# Patient Record
Sex: FEMALE | Race: WHITE | Employment: PART TIME | RURAL
[De-identification: names, ages, dates, MRNs, and addresses within clinical notes are randomized per-mention and may not be internally consistent; named-entity substitution may affect disease eponyms.]

---

## 2017-01-05 ENCOUNTER — TELEPHONE (OUTPATIENT)
Dept: PRIMARY CARE CLINIC | Age: 47
End: 2017-01-05

## 2017-01-07 ENCOUNTER — HOSPITAL ENCOUNTER (OUTPATIENT)
Facility: HOSPITAL | Age: 47
Setting detail: HOSPITAL OUTPATIENT SURGERY
End: 2017-01-07
Attending: OBSTETRICS & GYNECOLOGY | Admitting: OBSTETRICS & GYNECOLOGY

## 2017-01-12 PROBLEM — M48.061 LUMBAR STENOSIS: Status: ACTIVE | Noted: 2017-01-12

## 2017-01-17 ENCOUNTER — OFFICE VISIT (OUTPATIENT)
Dept: NEUROSURGERY | Facility: CLINIC | Age: 47
End: 2017-01-17

## 2017-01-17 VITALS — WEIGHT: 164 LBS | HEIGHT: 59 IN | BODY MASS INDEX: 33.06 KG/M2

## 2017-01-17 DIAGNOSIS — M43.16 SPONDYLOLISTHESIS OF LUMBAR REGION: Primary | ICD-10-CM

## 2017-01-17 PROCEDURE — 99243 OFF/OP CNSLTJ NEW/EST LOW 30: CPT | Performed by: NEUROLOGICAL SURGERY

## 2017-01-17 RX ORDER — HYDROCODONE BITARTRATE AND ACETAMINOPHEN 5; 325 MG/1; MG/1
TABLET ORAL
Refills: 0 | COMMUNITY
Start: 2016-12-22 | End: 2017-01-31

## 2017-01-17 RX ORDER — BACLOFEN 20 MG/1
20 TABLET ORAL 3 TIMES DAILY
Refills: 0 | COMMUNITY
Start: 2016-12-28

## 2017-01-17 NOTE — LETTER
January 17, 2017     KB Landa  [address] [facility] [place] [address]    Patient: Addie Camarillo   YOB: 1970   Date of Visit: 1/17/2017       Dear KB Puente:    Thank you for referring Addie Camarillo to me for evaluation. Below is a copy of my consult note.    If you have questions, please do not hesitate to call me. I look forward to following Addie along with you.         Sincerely,        Jurgen Umana MD        CC: No Recipients    Subjective   Patient ID: Addie Camarillo is a 46 y.o. female is being seen for consultation today at the request of KB Landa  Chief Complaint: Back and leg pain    History of Present Illness: Patient is 46-year-old woman with complaint of back pain in the lumbar spine that radiates to her hips and legs bilaterally currently more on the right than on the left but in the past it has been more on the left.  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  She used to work 70 hours, but a couple of years ago had to cut it back to part-time at 10 hours.  She is tried physical therapy but after only a few treatments found she was worse with the treatment plan without and had had to be stopped.  She is applying for disability at this point.  She is due to have a hysterectomy next month.  She is allergic to Lortab.  She uses ibuprofen and diclofenac for pain.    Review of Radiographic Studies:  Lumbar MRI scan on 12/20/16 shows a 25% spondylolisthesis at L5-S1 with bilateral foraminal stenosis more marked on the left than the right.  The remaining disc space levels are normal.    The following portions of the patient's history were reviewed, updated as appropriate and approved: allergies, current medications, past family history, past medical history, past social history, past surgical history, review of systems and problem list.  Review of Systems   Constitutional: Positive for chills and fatigue. Negative for  activity change, appetite change, diaphoresis, fever and unexpected weight change.   HENT: Negative for congestion, dental problem, drooling, ear discharge, ear pain, facial swelling, hearing loss, mouth sores, nosebleeds, postnasal drip, rhinorrhea, sinus pressure, sneezing, sore throat, tinnitus, trouble swallowing and voice change.    Eyes: Positive for photophobia. Negative for pain, discharge, redness, itching and visual disturbance.   Respiratory: Positive for cough and wheezing. Negative for apnea, choking, chest tightness, shortness of breath and stridor.    Cardiovascular: Negative for chest pain, palpitations and leg swelling.   Gastrointestinal: Negative for abdominal distention, abdominal pain, anal bleeding, blood in stool, constipation, diarrhea, nausea, rectal pain and vomiting.   Endocrine: Positive for cold intolerance and heat intolerance. Negative for polydipsia, polyphagia and polyuria.   Genitourinary: Positive for menstrual problem. Negative for decreased urine volume, difficulty urinating, dysuria, enuresis, flank pain, frequency, genital sores, hematuria and urgency.   Musculoskeletal: Positive for arthralgias, back pain, joint swelling, neck pain and neck stiffness. Negative for gait problem and myalgias.   Skin: Negative for color change, pallor, rash and wound.   Allergic/Immunologic: Negative for environmental allergies, food allergies and immunocompromised state.   Neurological: Positive for light-headedness, numbness and headaches. Negative for dizziness, tremors, seizures, syncope, facial asymmetry, speech difficulty and weakness.   Hematological: Negative for adenopathy. Bruises/bleeds easily.   Psychiatric/Behavioral: Positive for agitation, decreased concentration, dysphoric mood and sleep disturbance. Negative for behavioral problems, hallucinations, self-injury and suicidal ideas. The patient is nervous/anxious. The patient is not hyperactive.    All other systems reviewed and are  negative.      Objective     NEUROLOGICAL EXAMINATION:      MENTAL STATUS:  Alert and oriented.  Speech intact.  Recent and remote memory intact.      CRANIAL NERVES:  Cranial nerve II:  Visual fields are full to confrontation.  Cranial nerves III, IV and VI:  PERRLADC.  Extraocular movements are intact.  Nystagmus is not present.  Cranial nerve V:  Facial sensation is intact to light touch.  Cranial nerve VII:  Muscles of facial expression reveal no asymmetry.  Cranial nerve VIII:  Hearing is intact to finger rub bilaterally.  Cranial nerves IX and X:  Palate elevates symmetrically.  Cranial nerve XI:  Shoulder shrug is intact.  Cranial nerve XII:  Tongue is midline without evidence of atrophy or fasciculation.    MUSCULOSKELETAL:  SLR positive on the right at 40°, negative on the left.    MOTOR:  Knee extension ankle dorsiflexion and plantar flexion intact bilaterally.    SENSATION: No focal sensory loss in the legs or feet.    REFLEXES:  DTR 1+ in both knees and both ankles.    Assessment   L5-S1 spondylolisthesis with bilateral pars defect L5 and bilateral foraminal stenosis L5-S1.       Plan   See me in April again after she has recovered from her hysterectomy, and we will discuss the surgery necessary to treat this which would be a PLIF at L5-S1 with pedicle screws.  I have shown her images of the surgery and given her a brief description of it today.        Jurgen Umana MD

## 2017-01-17 NOTE — PATIENT INSTRUCTIONS
Spondylolisthesis With Rehab  The slipping of one or multiple vertebrae out of the correct anatomical position is a condition known as spondylolisthesis. Spondylolisthesis is most common in adolescents and is caused by a number of different reasons, such as vertebral fracture or something you are born with (congenital). Spondylolisthesis is diagnosed with the use of X-rays.  SYMPTOMS   · Dull, achy pain in the lower back.  · Pain that worsens with extension of the spine.  · Tightness of the muscles on the back of the thigh.  · Lower back stiffness.  · Signs of nerve damage: pain, numbness, or weakness affecting one or both lower extremities.  · Muscle wasting (atrophy), uncommon.  · Loss of stool (bowel) or urine (bladder) function.  CAUSES   The symptoms of spondylolisthesis are caused by one or more vertebrae that are out of alignment, placing pressure on the spinal cord. Common mechanisms of injury include:  · Congenital defect of the spine.  · Degenerative process.  · Stress fracture of the spine.  · Fracture due to trauma to the spine.  RISK INCREASES WITH:  · Activities that have a risk of hyperextending the back.  · Activities that have a risk of excessively rotating the spine.  · Poor strength and flexibility.  · Failure to warm up properly before activity.  · Family history of spondylolysis or spondylolisthesis.  · Improper sports technique.  PREVENTION  · Warm up and stretch properly before activity.  · Allow for adequate recovery between workouts.  · Maintain physical fitness:    Strength, flexibility, and endurance.    Cardiovascular fitness.  · Learn and use proper technique. When possible, have a  correct improper technique.  PROGNOSIS   If treated properly, the spondylolisthesis usually resolves.  RELATED COMPLICATIONS   · Recurrent symptoms that result in a chronic problem.  · Inability to compete in athletics.  · Prolonged healing time, if improperly treated or reinjured.  · Failure of the  fracture to heal (nonunion).  · Healing of the fracture in a poor position (malunion).  TREATMENT  Treatment initially involves resting from any activities that aggravate the symptoms and the use of ice and medications to help reduce pain and inflammation. The use of strengthening and stretching exercises may help reduce pain with activity. These exercises may be performed at home or with referral to a therapist. It is important to learn how to use proper body mechanics as to not place undue stress on your spine. If the injury is severe, then your caregiver may recommend a back brace to allow for healing, or even surgery. Surgery often involves fusing two adjacent vertebrae so no movement is allowed between them.   MEDICATION   · If pain medication is necessary, then nonsteroidal anti-inflammatory medications, such as aspirin and ibuprofen, or other minor pain relievers, such as acetaminophen, are often recommended.  · Do not take pain medication for 7 days before surgery.  · Prescription pain relievers may be given if deemed necessary by your caregiver. Use only as directed and only as much as you need.  HEAT AND COLD  · Cold treatment (icing) relieves pain and reduces inflammation. Cold treatment should be applied for 10 to 15 minutes every 2 to 3 hours for inflammation and pain and immediately after any activity that aggravates your symptoms. Use ice packs or massage the area with a piece of ice (ice massage).  · Heat treatment may be used prior to performing the stretching and strengthening activities prescribed by your caregiver, physical therapist, or . Use a heat pack or soak the injury in warm water.  SEEK MEDICAL CARE IF:  · Treatment seems to offer no benefit, or the condition worsens.  · Any medications produce adverse side effects.  · Any complications from surgery occur:    Pain, numbness, or coldness in the extremity operated upon.    Discoloration of the nail beds (they become blue or  gray) of the extremity operated upon.    Signs of infections (fever, pain, inflammation, redness, or persistent bleeding).  EXERCISES  RANGE OF MOTION (ROM) AND STRETCHING EXERCISES - Spondylolisthesis  Most people with low back pain will find that their symptoms worsen with either excessive bending forward (flexion) or arching at the low back (extension). The exercises which will help resolve your symptoms will focus on the opposite motion. Your physician, physical therapist or  will help you determine which exercises will be most helpful to resolve your low back pain. Do not complete any exercises without first consulting with your clinician. Discontinue any exercises which worsen your symptoms until you speak to your clinician. If you have pain, numbness or tingling which travels down into your buttocks, leg, or foot, the goal of the therapy is for these symptoms to move closer to your back and eventually resolve. Occasionally, these leg symptoms will get better, but your low back pain may worsen; this is typically an indication of progress in your rehabilitation. Be certain to be very alert to any changes in your symptoms and the activities in which you participated in the 24 hours prior to the change. Sharing this information with your clinician will allow him/her to most efficiently treat your condition.  These exercises may help you when beginning to rehabilitate your injury. Your symptoms may resolve with or without further involvement from your physician, physical therapist or . While completing these exercises, remember:   · Restoring tissue flexibility helps normal motion to return to the joints. This allows healthier, less painful movement and activity.  · An effective stretch should be held for at least 30 seconds.  · A stretch should never be painful. You should only feel a gentle lengthening or release in the stretched tissue.  FLEXION RANGE OF MOTION AND STRETCHING  "EXERCISES:  STRETCH - Flexion, Single Knee to Chest  · Lie on a firm bed or floor with both legs extended in front of you.  · Keeping one leg in contact with the floor, bring your opposite knee to your chest. Hold your leg in place by either grabbing behind your thigh or at your knee.  · Pull until you feel a gentle stretch in your low back. Hold __________ seconds.  Slowly release your grasp and repeat the exercise with the opposite side.  Repeat __________ times. Complete this exercise __________ times per day.   STRETCH - Flexion, Double Knee to Chest  · Lie on a firm bed or floor with both legs extended in front of you.  · Keeping one leg in contact with the floor, bring your opposite knee to your chest.  · Tense your stomach muscles to support your back and then lift your other knee to your chest. Hold your legs in place by either grabbing behind your thighs or at your knees.  · Pull both knees toward your chest until you feel a gentle stretch in your low back. Hold __________ seconds.  · Tense your stomach muscles and slowly return one leg at a time to the floor.  Repeat __________ times. Complete this exercise __________ times per day.   STRENGTHENING EXERCISES - Spondylolisthesis  These exercises may help you when beginning to rehabilitate your injury. These exercises should be done near your \"sweet spot.\" This is the neutral, low-back arch, somewhere between fully rounded and fully arched, that is your least painful position. When performed in this safe range of motion, these exercises can be used for people who have either a flexion or extension based injury. These exercises may resolve your symptoms with or without further involvement from your physician, physical therapist or . While completing these exercises, remember:   · Muscles can gain both the endurance and the strength needed for everyday activities through controlled exercises.  · Complete these exercises as instructed by your " physician, physical therapist or . Progress the resistance and repetitions only as guided.  · You may experience muscle soreness or fatigue, but the pain or discomfort you are trying to eliminate should never worsen during these exercises. If this pain does worsen, stop and make certain you are following the directions exactly. If the pain is still present after adjustments, discontinue the exercise until you can discuss the trouble with your clinician.  STRENGTHENING - Deep Abdominals, Pelvic Tilt   · Lie on a firm bed or floor. Keeping your legs in front of you, bend your knees so they are both pointed toward the ceiling and your feet are flat on the floor.  · Tense your lower abdominal muscles to press your low back into the floor. This motion will rotate your pelvis so that your tail bone is scooping upwards rather than pointing at your feet or into the floor.  · With a gentle tension and even breathing, hold this position for __________ seconds.  Repeat __________ times. Complete this exercise __________ times per day.   STRENGTHENING - Abdominals, Crunches   · Lie on a firm bed or floor. Keeping your legs in front of you, bend your knees so they are both pointed toward the ceiling and your feet are flat on the floor. Cross your arms over your chest.  · Slightly tip your chin down without bending your neck.  · Tense your abdominals and slowly lift your trunk high enough to just clear your shoulder blades. Lifting higher can put excessive stress on the low back and does not further strengthen your abdominal muscles.  · Control your return to the starting position.  Repeat __________ times. Complete this exercise __________ times per day.   STRENGTHENING - Quadruped, Opposite UE/LE Lift   · Assume a hands and knees position on a firm surface. Keep your hands under your shoulders and your knees under your hips. You may place padding under your knees for comfort.  · Find your neutral spine and  gently tense your abdominal muscles so that you can maintain this position. Your shoulders and hips should form a rectangle that is parallel with the floor and is not twisted.  · Keeping your trunk steady, lift your right hand no higher than your shoulder and then your left leg no higher than your hip. Make sure you are not holding your breath. Hold this position __________ seconds.  · Continuing to keep your abdominal muscles tense and your back steady, slowly return to your starting position. Repeat with the opposite arm and leg.  Repeat __________ times. Complete this exercise __________ times per day.   STRENGTHENING - Lower Abdominals, Double Knee Lift  · Lie on a firm bed or floor. Keeping your legs in front of you, bend your knees so they are both pointed toward the ceiling and your feet are flat on the floor.  · Tense your abdominal muscles to brace your low back and slowly lift both of your knees until they come over your hips. Be certain not to hold your breath.  · Hold __________ seconds. Using your abdominal muscles, return to the starting position in a slow and controlled manner.  Repeat __________ times. Complete this exercise __________ times per day.   POSTURE AND BODY MECHANICS CONSIDERATIONS - Spondylolisthesis  Keeping correct posture when sitting, standing or completing your activities will reduce the stress put on different body tissues, allowing injured tissues a chance to heal and limiting painful experiences. The following are general guidelines for improved posture. Your physician or physical therapist will provide you with any instructions specific to your needs. While reading these guidelines, remember:  · The exercises prescribed by your provider will help you have the flexibility and strength to maintain correct postures.  · The correct posture provides the optimal environment for your joints to work. All of your joints have less wear and tear when properly supported by a spine with good  posture. This means you will experience a healthier, less painful body.  · Correct posture must be practiced with all of your activities, especially prolonged sitting and standing. Correct posture is as important when doing repetitive low-stress activities (typing) as it is when doing a single heavy-load activity (lifting).  PROPER SITTING POSTURE  In order to minimize stress and discomfort on your spine, you must sit with correct posture. Sitting with good posture should be effortless for a healthy body. Returning to good posture is a gradual process. Many people can work toward this most comfortably by using various supports until they have the flexibility and strength to maintain this posture on their own.  When sitting with proper posture, your ears will fall over your shoulders and your shoulders will fall over your hips. You should use the back of the chair to support your upper back. Your low back will be in a neutral position, just slightly arched. You may place a small pillow or folded towel at the base of your low back for   support.   When working at a desk, create an environment that supports good, upright posture. Without extra support, muscles fatigue and lead to excessive strain on joints and other tissues. Keep these recommendations in mind:  CHAIR:  · A chair should be able to slide under your desk when your back makes contact with the back of the chair. This allows you to work closely.  · The chair's height should allow your eyes to be level with the upper part of your monitor and your hands to be slightly lower than your elbows.  BODY POSITION  · Your feet should make contact with the floor. If this is not possible, use a foot rest.  · Keep your ears over your shoulders. This will reduce stress on your neck and low back.  INCORRECT SITTING POSTURES  If you are feeling tired and unable to assume a healthy sitting posture, do not slouch or slump. This puts excessive strain on your back tissues,  causing more damage and pain. Healthier options include:  · Using more support, like a lumbar pillow.  · Switching tasks to something that requires you to be upright or walking.  · Taking a brief walk.  · Lying down to rest in a neutral-spine position.   CORRECT LIFTING TECHNIQUES  DO:   · Assume a wide stance. This will provide you more stability and the opportunity to get as close as possible to the object which you are lifting.  · Tense your abdominals to brace your spine; then bend at the knees and hips. Keeping your back locked in a neutral-spine position, lift using your leg muscles. Lift with your legs, keeping your back straight.  · Test the weight of unknown objects before attempting to lift them.  · Try to keep your elbows locked down at your sides in order get the best strength from your shoulders when carrying an object.  · Always ask for help when lifting heavy or awkward objects.  INCORRECT LIFTING TECHNIQUES  DO NOT:   · Lock your knees when lifting, even if it is a small object.  · Bend and twist. Pivot at your feet or move your feet when needing to change directions.  · Assume that you cannot safely  a paperclip without proper posture.     This information is not intended to replace advice given to you by your health care provider. Make sure you discuss any questions you have with your health care provider.     Document Released: 12/18/2006 Document Revised: 09/07/2016 Document Reviewed: 04/01/2010  [employer] Interactive Patient Education ©2016 [employer]    Spinal Fusion  Spinal fusion is a procedure to make 2 or more of the bones in your spinal column (vertebrae) grow together (fuse). This procedure stops movement between the vertebrae and can relieve pain and prevent deformity.   Spinal fusion is used to treat the following conditions:  · Fractures of the spine.  · Herniated disk (the spongy material [cartilage] between the vertebrae).  · Abnormal curvatures of the spine, such as  scoliosis or kyphosis.  · A weak or an unstable spine, caused by infections or tumor.  RISKS AND COMPLICATIONS  Complications associated with spinal fusion are rare, but they can occur. Possible complications include:  · Bleeding.  · Infection near the incision.  · Nerve damage. Signs of nerve damage are back pain, pain in one or both legs, weakness, or numbness.  · Spinal fluid leakage.  · Blood clot in your leg, which can move to your lungs.  · Difficulty controlling urination or bowel movements.  BEFORE THE PROCEDURE  · A medical evaluation will be done. This will include a physical exam, blood tests, and imaging exams.  · You will talk with an anesthesiologist. This is the person who will be in charge of the anesthesia during the procedure. Spinal fusion usually requires that you are asleep during the procedure (general anesthesia).  · You will need to stop taking certain medicines, particularly those associated with an increased risk of bleeding. Ask your caregiver about changing or stopping your regular medicines.  · If you smoke, you will need to stop at least 2 weeks before the procedure. Smoking can slow down the healing process, especially fusion of the vertebrae, and increase the risk of complications.  · Do not eat or drink anything for at least 8 hours before the procedure.  PROCEDURE   A cut (incision) is made over the vertebrae that will be fused. The back muscles are  from the vertebrae. If you are having this procedure to treat a herniated disk, the disc material pressing on the nerve root is removed (decompression). The area where the disk is removed is then filled with extra bone. Bone from another part of your body (autogenous bone) or bone from a bone donor (allograft bone) may be used. The extra bone promotes fusion between the vertebrae. Sometimes, specific medicines are added to the fusion area to promote bone healing. In most cases, screws and rods or metal plates will be used to  "attach the vertebrae to stabilize them while they fuse.   AFTER THE PROCEDURE   · You will stay in a recovery area until the anesthesia has worn off. Your blood pressure and pulse will be checked frequently.  · You will be given antibiotics to prevent infection.  · You may continue to receive fluids through an intravenous (IV) tube while you are still in the hospital.  · Pain after surgery is normal. You will be given pain medicine.  · You will be taught how to move correctly and how to stand and walk. While in bed, you will be instructed to turn frequently, using a \"log rolling\" technique, in which the entire body is moved without twisting the back.     This information is not intended to replace advice given to you by your health care provider. Make sure you discuss any questions you have with your health care provider.     Document Released: 09/15/2004 Document Revised: 03/11/2013 Document Reviewed: 06/01/2016  [email or web address] Interactive Patient Education ©2016 [email or web address]    Spinal Fusion, Care After  Refer to this sheet in the next few weeks. These instructions provide you with information on caring for yourself after your procedure. Your caregiver may also give you more specific instructions. Your treatment has been planned according to current medical practices, but problems sometimes occur. Call your caregiver if you have any problems or questions after your procedure.  HOME CARE INSTRUCTIONS   · Take whatever pain medicine has been prescribed by your caregiver. Do not take over-the-counter pain medicine unless directed otherwise by your caregiver.  · Do not drive if you are taking narcotic pain medicines.  · Change your bandage (dressing) if necessary or as directed by your caregiver.  · Do not get your surgical cut (incision) wet. After a few days you may take quick showers (rather than baths), but keep your incision clean and dry. Covering the incision with plastic wrap while you shower should keep your " incision dry. A few weeks after surgery, once your incision has healed and your caregiver says it is okay, you can take baths or go swimming.  · If you have been prescribed medicine to prevent your blood from clotting, follow the directions carefully.  · Check the area around your incision often. Look for redness and swelling. Also, look for anything leaking from your wound. You can use a mirror or have a family member inspect your incision if it is in a place where it is difficult for you to see.  · Ask your caregiver what activities you should avoid and for how long.  · Walk as much as possible.  · Do not lift anything heavier than 10 pounds (4.5 kilograms) until your caregiver says it is safe.  · Do not twist or bend for a few weeks. Try not to pull on things. Avoid sitting for long periods of time. Change positions at least every hour.  · Ask your caregiver what kinds of exercise you should do to make your back stronger and when you should begin doing these exercises.  SEEK IMMEDIATE MEDICAL CARE IF:   · Pain suddenly becomes much worse.  · The incision area is red, swollen, bleeding, or leaking fluid.  · Your legs or feet become increasingly painful, numb, weak, or swollen.  · You have trouble controlling urination or bowel movements.  · You have trouble breathing.  · You have chest pain.  · You have a fever.  MAKE SURE YOU:  · Understand these instructions.  · Will watch your condition.  · Will get help right away if you are not doing well or get worse.     This information is not intended to replace advice given to you by your health care provider. Make sure you discuss any questions you have with your health care provider.     Document Released: 07/07/2006 Document Revised: 01/08/2016 Document Reviewed: 06/01/2016  [employer] Interactive Patient Education ©2016 [employer]      If you have any questions in regards to your visit with  today, please do not hesitate to contact our office. Ask to speak  with a CMA for any clinical questions you may have.    Marta Sanches, CMA    [phone]

## 2017-01-17 NOTE — PROGRESS NOTES
Subjective   Patient ID: Addie Camarillo is a 46 y.o. female is being seen for consultation today at the request of KB Landa  Chief Complaint: Back and leg pain    History of Present Illness: Patient is 46-year-old woman with complaint of back pain in the lumbar spine that radiates to her hips and legs bilaterally currently more on the right than on the left but in the past it has been more on the left.  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  She used to work 70 hours, but a couple of years ago had to cut it back to part-time at 10 hours.  She is tried physical therapy but after only a few treatments found she was worse with the treatment plan without and had had to be stopped.  She is applying for disability at this point.  She is due to have a hysterectomy next month.  She is allergic to Lortab.  She uses ibuprofen and diclofenac for pain.    Review of Radiographic Studies:  Lumbar MRI scan on 12/20/16 shows a 25% spondylolisthesis at L5-S1 with bilateral foraminal stenosis more marked on the left than the right.  The remaining disc space levels are normal.    The following portions of the patient's history were reviewed, updated as appropriate and approved: allergies, current medications, past family history, past medical history, past social history, past surgical history, review of systems and problem list.  Review of Systems   Constitutional: Positive for chills and fatigue. Negative for activity change, appetite change, diaphoresis, fever and unexpected weight change.   HENT: Negative for congestion, dental problem, drooling, ear discharge, ear pain, facial swelling, hearing loss, mouth sores, nosebleeds, postnasal drip, rhinorrhea, sinus pressure, sneezing, sore throat, tinnitus, trouble swallowing and voice change.    Eyes: Positive for photophobia. Negative for pain, discharge, redness, itching and visual disturbance.   Respiratory: Positive for cough and  wheezing. Negative for apnea, choking, chest tightness, shortness of breath and stridor.    Cardiovascular: Negative for chest pain, palpitations and leg swelling.   Gastrointestinal: Negative for abdominal distention, abdominal pain, anal bleeding, blood in stool, constipation, diarrhea, nausea, rectal pain and vomiting.   Endocrine: Positive for cold intolerance and heat intolerance. Negative for polydipsia, polyphagia and polyuria.   Genitourinary: Positive for menstrual problem. Negative for decreased urine volume, difficulty urinating, dysuria, enuresis, flank pain, frequency, genital sores, hematuria and urgency.   Musculoskeletal: Positive for arthralgias, back pain, joint swelling, neck pain and neck stiffness. Negative for gait problem and myalgias.   Skin: Negative for color change, pallor, rash and wound.   Allergic/Immunologic: Negative for environmental allergies, food allergies and immunocompromised state.   Neurological: Positive for light-headedness, numbness and headaches. Negative for dizziness, tremors, seizures, syncope, facial asymmetry, speech difficulty and weakness.   Hematological: Negative for adenopathy. Bruises/bleeds easily.   Psychiatric/Behavioral: Positive for agitation, decreased concentration, dysphoric mood and sleep disturbance. Negative for behavioral problems, hallucinations, self-injury and suicidal ideas. The patient is nervous/anxious. The patient is not hyperactive.    All other systems reviewed and are negative.      Objective     NEUROLOGICAL EXAMINATION:      MENTAL STATUS:  Alert and oriented.  Speech intact.  Recent and remote memory intact.      CRANIAL NERVES:  Cranial nerve II:  Visual fields are full to confrontation.  Cranial nerves III, IV and VI:  PERRLADC.  Extraocular movements are intact.  Nystagmus is not present.  Cranial nerve V:  Facial sensation is intact to light touch.  Cranial nerve VII:  Muscles of facial expression reveal no asymmetry.  Cranial nerve  VIII:  Hearing is intact to finger rub bilaterally.  Cranial nerves IX and X:  Palate elevates symmetrically.  Cranial nerve XI:  Shoulder shrug is intact.  Cranial nerve XII:  Tongue is midline without evidence of atrophy or fasciculation.    MUSCULOSKELETAL:  SLR positive on the right at 40°, negative on the left.    MOTOR:  Knee extension ankle dorsiflexion and plantar flexion intact bilaterally.    SENSATION: No focal sensory loss in the legs or feet.    REFLEXES:  DTR 1+ in both knees and both ankles.    Assessment   L5-S1 spondylolisthesis with bilateral pars defect L5 and bilateral foraminal stenosis L5-S1.       Plan   See me in April again after she has recovered from her hysterectomy, and we will discuss the surgery necessary to treat this which would be a PLIF at L5-S1 with pedicle screws.  I have shown her images of the surgery and given her a brief description of it today.        Jurgen Umana MD

## 2017-01-17 NOTE — MR AVS SNAPSHOT
Addie Camarillo   1/17/2017 3:00 PM   Office Visit    Dept Phone:  [phone]   Encounter #:  [id number]    Provider:  Jurgen Umana MD   Department:  [facility]                Your Full Care Plan              Today's Medication Changes          These changes are accurate as of: 1/17/17  2:32 PM.  If you have any questions, ask your nurse or doctor.               Medication(s)that have changed:     baclofen 20 MG tablet   Commonly known as:  LIORESAL   What changed:  Another medication with the same name was removed. Continue taking this medication, and follow the directions you see here.   Changed by:  Jurgen Umana MD                  Your Updated Medication List          This list is accurate as of: 1/17/17  2:32 PM.  Always use your most recent med list.                baclofen 20 MG tablet   Commonly known as:  LIORESAL       ENDOCET  MG per tablet   Generic drug:  oxyCODONE-acetaminophen       HYDROcodone-acetaminophen 5-325 MG per tablet   Commonly known as:  NORCO       ibuprofen 600 MG tablet   Commonly known as:  ADVIL,MOTRIN       levothyroxine 75 MCG tablet   Commonly known as:  SYNTHROID, LEVOTHROID       omeprazole 20 MG capsule   Commonly known as:  priLOSEC       PROVENTIL  (90 BASE) MCG/ACT inhaler   Generic drug:  albuterol       valsartan-hydrochlorothiazide 320-12.5 MG per tablet   Commonly known as:  DIOVAN-HCT               You Were Diagnosed With        Codes Comments    Spondylolisthesis of lumbar region    -  Primary ICD-10-CM: M43.16  ICD-9-CM: 738.4       Instructions    Spondylolisthesis With Rehab  The slipping of one or multiple vertebrae out of the correct anatomical position is a condition known as spondylolisthesis. Spondylolisthesis is most common in adolescents and is caused by a number of different reasons, such as vertebral fracture or something you are born with (congenital). Spondylolisthesis is  diagnosed with the use of X-rays.  SYMPTOMS   · Dull, achy pain in the lower back.  · Pain that worsens with extension of the spine.  · Tightness of the muscles on the back of the thigh.  · Lower back stiffness.  · Signs of nerve damage: pain, numbness, or weakness affecting one or both lower extremities.  · Muscle wasting (atrophy), uncommon.  · Loss of stool (bowel) or urine (bladder) function.  CAUSES   The symptoms of spondylolisthesis are caused by one or more vertebrae that are out of alignment, placing pressure on the spinal cord. Common mechanisms of injury include:  · Congenital defect of the spine.  · Degenerative process.  · Stress fracture of the spine.  · Fracture due to trauma to the spine.  RISK INCREASES WITH:  · Activities that have a risk of hyperextending the back.  · Activities that have a risk of excessively rotating the spine.  · Poor strength and flexibility.  · Failure to warm up properly before activity.  · Family history of spondylolysis or spondylolisthesis.  · Improper sports technique.  PREVENTION  · Warm up and stretch properly before activity.  · Allow for adequate recovery between workouts.  · Maintain physical fitness:    Strength, flexibility, and endurance.    Cardiovascular fitness.  · Learn and use proper technique. When possible, have a  correct improper technique.  PROGNOSIS   If treated properly, the spondylolisthesis usually resolves.  RELATED COMPLICATIONS   · Recurrent symptoms that result in a chronic problem.  · Inability to compete in athletics.  · Prolonged healing time, if improperly treated or reinjured.  · Failure of the fracture to heal (nonunion).  · Healing of the fracture in a poor position (malunion).  TREATMENT  Treatment initially involves resting from any activities that aggravate the symptoms and the use of ice and medications to help reduce pain and inflammation. The use of strengthening and stretching exercises may help reduce pain with activity.  These exercises may be performed at home or with referral to a therapist. It is important to learn how to use proper body mechanics as to not place undue stress on your spine. If the injury is severe, then your caregiver may recommend a back brace to allow for healing, or even surgery. Surgery often involves fusing two adjacent vertebrae so no movement is allowed between them.   MEDICATION   · If pain medication is necessary, then nonsteroidal anti-inflammatory medications, such as aspirin and ibuprofen, or other minor pain relievers, such as acetaminophen, are often recommended.  · Do not take pain medication for 7 days before surgery.  · Prescription pain relievers may be given if deemed necessary by your caregiver. Use only as directed and only as much as you need.  HEAT AND COLD  · Cold treatment (icing) relieves pain and reduces inflammation. Cold treatment should be applied for 10 to 15 minutes every 2 to 3 hours for inflammation and pain and immediately after any activity that aggravates your symptoms. Use ice packs or massage the area with a piece of ice (ice massage).  · Heat treatment may be used prior to performing the stretching and strengthening activities prescribed by your caregiver, physical therapist, or . Use a heat pack or soak the injury in warm water.  SEEK MEDICAL CARE IF:  · Treatment seems to offer no benefit, or the condition worsens.  · Any medications produce adverse side effects.  · Any complications from surgery occur:    Pain, numbness, or coldness in the extremity operated upon.    Discoloration of the nail beds (they become blue or gray) of the extremity operated upon.    Signs of infections (fever, pain, inflammation, redness, or persistent bleeding).  EXERCISES  RANGE OF MOTION (ROM) AND STRETCHING EXERCISES - Spondylolisthesis  Most people with low back pain will find that their symptoms worsen with either excessive bending forward (flexion) or arching at the low  back (extension). The exercises which will help resolve your symptoms will focus on the opposite motion. Your physician, physical therapist or  will help you determine which exercises will be most helpful to resolve your low back pain. Do not complete any exercises without first consulting with your clinician. Discontinue any exercises which worsen your symptoms until you speak to your clinician. If you have pain, numbness or tingling which travels down into your buttocks, leg, or foot, the goal of the therapy is for these symptoms to move closer to your back and eventually resolve. Occasionally, these leg symptoms will get better, but your low back pain may worsen; this is typically an indication of progress in your rehabilitation. Be certain to be very alert to any changes in your symptoms and the activities in which you participated in the 24 hours prior to the change. Sharing this information with your clinician will allow him/her to most efficiently treat your condition.  These exercises may help you when beginning to rehabilitate your injury. Your symptoms may resolve with or without further involvement from your physician, physical therapist or . While completing these exercises, remember:   · Restoring tissue flexibility helps normal motion to return to the joints. This allows healthier, less painful movement and activity.  · An effective stretch should be held for at least 30 seconds.  · A stretch should never be painful. You should only feel a gentle lengthening or release in the stretched tissue.  FLEXION RANGE OF MOTION AND STRETCHING EXERCISES:  STRETCH - Flexion, Single Knee to Chest  · Lie on a firm bed or floor with both legs extended in front of you.  · Keeping one leg in contact with the floor, bring your opposite knee to your chest. Hold your leg in place by either grabbing behind your thigh or at your knee.  · Pull until you feel a gentle stretch in your low back.  "Hold __________ seconds.  Slowly release your grasp and repeat the exercise with the opposite side.  Repeat __________ times. Complete this exercise __________ times per day.   STRETCH - Flexion, Double Knee to Chest  · Lie on a firm bed or floor with both legs extended in front of you.  · Keeping one leg in contact with the floor, bring your opposite knee to your chest.  · Tense your stomach muscles to support your back and then lift your other knee to your chest. Hold your legs in place by either grabbing behind your thighs or at your knees.  · Pull both knees toward your chest until you feel a gentle stretch in your low back. Hold __________ seconds.  · Tense your stomach muscles and slowly return one leg at a time to the floor.  Repeat __________ times. Complete this exercise __________ times per day.   STRENGTHENING EXERCISES - Spondylolisthesis  These exercises may help you when beginning to rehabilitate your injury. These exercises should be done near your \"sweet spot.\" This is the neutral, low-back arch, somewhere between fully rounded and fully arched, that is your least painful position. When performed in this safe range of motion, these exercises can be used for people who have either a flexion or extension based injury. These exercises may resolve your symptoms with or without further involvement from your physician, physical therapist or . While completing these exercises, remember:   · Muscles can gain both the endurance and the strength needed for everyday activities through controlled exercises.  · Complete these exercises as instructed by your physician, physical therapist or . Progress the resistance and repetitions only as guided.  · You may experience muscle soreness or fatigue, but the pain or discomfort you are trying to eliminate should never worsen during these exercises. If this pain does worsen, stop and make certain you are following the directions exactly. " If the pain is still present after adjustments, discontinue the exercise until you can discuss the trouble with your clinician.  STRENGTHENING - Deep Abdominals, Pelvic Tilt   · Lie on a firm bed or floor. Keeping your legs in front of you, bend your knees so they are both pointed toward the ceiling and your feet are flat on the floor.  · Tense your lower abdominal muscles to press your low back into the floor. This motion will rotate your pelvis so that your tail bone is scooping upwards rather than pointing at your feet or into the floor.  · With a gentle tension and even breathing, hold this position for __________ seconds.  Repeat __________ times. Complete this exercise __________ times per day.   STRENGTHENING - Abdominals, Crunches   · Lie on a firm bed or floor. Keeping your legs in front of you, bend your knees so they are both pointed toward the ceiling and your feet are flat on the floor. Cross your arms over your chest.  · Slightly tip your chin down without bending your neck.  · Tense your abdominals and slowly lift your trunk high enough to just clear your shoulder blades. Lifting higher can put excessive stress on the low back and does not further strengthen your abdominal muscles.  · Control your return to the starting position.  Repeat __________ times. Complete this exercise __________ times per day.   STRENGTHENING - Quadruped, Opposite UE/LE Lift   · Assume a hands and knees position on a firm surface. Keep your hands under your shoulders and your knees under your hips. You may place padding under your knees for comfort.  · Find your neutral spine and gently tense your abdominal muscles so that you can maintain this position. Your shoulders and hips should form a rectangle that is parallel with the floor and is not twisted.  · Keeping your trunk steady, lift your right hand no higher than your shoulder and then your left leg no higher than your hip. Make sure you are not holding your breath. Hold  this position __________ seconds.  · Continuing to keep your abdominal muscles tense and your back steady, slowly return to your starting position. Repeat with the opposite arm and leg.  Repeat __________ times. Complete this exercise __________ times per day.   STRENGTHENING - Lower Abdominals, Double Knee Lift  · Lie on a firm bed or floor. Keeping your legs in front of you, bend your knees so they are both pointed toward the ceiling and your feet are flat on the floor.  · Tense your abdominal muscles to brace your low back and slowly lift both of your knees until they come over your hips. Be certain not to hold your breath.  · Hold __________ seconds. Using your abdominal muscles, return to the starting position in a slow and controlled manner.  Repeat __________ times. Complete this exercise __________ times per day.   POSTURE AND BODY MECHANICS CONSIDERATIONS - Spondylolisthesis  Keeping correct posture when sitting, standing or completing your activities will reduce the stress put on different body tissues, allowing injured tissues a chance to heal and limiting painful experiences. The following are general guidelines for improved posture. Your physician or physical therapist will provide you with any instructions specific to your needs. While reading these guidelines, remember:  · The exercises prescribed by your provider will help you have the flexibility and strength to maintain correct postures.  · The correct posture provides the optimal environment for your joints to work. All of your joints have less wear and tear when properly supported by a spine with good posture. This means you will experience a healthier, less painful body.  · Correct posture must be practiced with all of your activities, especially prolonged sitting and standing. Correct posture is as important when doing repetitive low-stress activities (typing) as it is when doing a single heavy-load activity (lifting).  PROPER SITTING  POSTURE  In order to minimize stress and discomfort on your spine, you must sit with correct posture. Sitting with good posture should be effortless for a healthy body. Returning to good posture is a gradual process. Many people can work toward this most comfortably by using various supports until they have the flexibility and strength to maintain this posture on their own.  When sitting with proper posture, your ears will fall over your shoulders and your shoulders will fall over your hips. You should use the back of the chair to support your upper back. Your low back will be in a neutral position, just slightly arched. You may place a small pillow or folded towel at the base of your low back for   support.   When working at a desk, create an environment that supports good, upright posture. Without extra support, muscles fatigue and lead to excessive strain on joints and other tissues. Keep these recommendations in mind:  CHAIR:  · A chair should be able to slide under your desk when your back makes contact with the back of the chair. This allows you to work closely.  · The chair's height should allow your eyes to be level with the upper part of your monitor and your hands to be slightly lower than your elbows.  BODY POSITION  · Your feet should make contact with the floor. If this is not possible, use a foot rest.  · Keep your ears over your shoulders. This will reduce stress on your neck and low back.  INCORRECT SITTING POSTURES  If you are feeling tired and unable to assume a healthy sitting posture, do not slouch or slump. This puts excessive strain on your back tissues, causing more damage and pain. Healthier options include:  · Using more support, like a lumbar pillow.  · Switching tasks to something that requires you to be upright or walking.  · Taking a brief walk.  · Lying down to rest in a neutral-spine position.   CORRECT LIFTING TECHNIQUES  DO:   · Assume a wide stance. This will provide you more  stability and the opportunity to get as close as possible to the object which you are lifting.  · Tense your abdominals to brace your spine; then bend at the knees and hips. Keeping your back locked in a neutral-spine position, lift using your leg muscles. Lift with your legs, keeping your back straight.  · Test the weight of unknown objects before attempting to lift them.  · Try to keep your elbows locked down at your sides in order get the best strength from your shoulders when carrying an object.  · Always ask for help when lifting heavy or awkward objects.  INCORRECT LIFTING TECHNIQUES  DO NOT:   · Lock your knees when lifting, even if it is a small object.  · Bend and twist. Pivot at your feet or move your feet when needing to change directions.  · Assume that you cannot safely  a paperclip without proper posture.     This information is not intended to replace advice given to you by your health care provider. Make sure you discuss any questions you have with your health care provider.     Document Released: 12/18/2006 Document Revised: 09/07/2016 Document Reviewed: 04/01/2010  [employer] Interactive Patient Education ©2016 [employer]    Spinal Fusion  Spinal fusion is a procedure to make 2 or more of the bones in your spinal column (vertebrae) grow together (fuse). This procedure stops movement between the vertebrae and can relieve pain and prevent deformity.   Spinal fusion is used to treat the following conditions:  · Fractures of the spine.  · Herniated disk (the spongy material [cartilage] between the vertebrae).  · Abnormal curvatures of the spine, such as scoliosis or kyphosis.  · A weak or an unstable spine, caused by infections or tumor.  RISKS AND COMPLICATIONS  Complications associated with spinal fusion are rare, but they can occur. Possible complications include:  · Bleeding.  · Infection near the incision.  · Nerve damage. Signs of nerve damage are back pain, pain in one or both legs,  weakness, or numbness.  · Spinal fluid leakage.  · Blood clot in your leg, which can move to your lungs.  · Difficulty controlling urination or bowel movements.  BEFORE THE PROCEDURE  · A medical evaluation will be done. This will include a physical exam, blood tests, and imaging exams.  · You will talk with an anesthesiologist. This is the person who will be in charge of the anesthesia during the procedure. Spinal fusion usually requires that you are asleep during the procedure (general anesthesia).  · You will need to stop taking certain medicines, particularly those associated with an increased risk of bleeding. Ask your caregiver about changing or stopping your regular medicines.  · If you smoke, you will need to stop at least 2 weeks before the procedure. Smoking can slow down the healing process, especially fusion of the vertebrae, and increase the risk of complications.  · Do not eat or drink anything for at least 8 hours before the procedure.  PROCEDURE   A cut (incision) is made over the vertebrae that will be fused. The back muscles are  from the vertebrae. If you are having this procedure to treat a herniated disk, the disc material pressing on the nerve root is removed (decompression). The area where the disk is removed is then filled with extra bone. Bone from another part of your body (autogenous bone) or bone from a bone donor (allograft bone) may be used. The extra bone promotes fusion between the vertebrae. Sometimes, specific medicines are added to the fusion area to promote bone healing. In most cases, screws and rods or metal plates will be used to attach the vertebrae to stabilize them while they fuse.   AFTER THE PROCEDURE   · You will stay in a recovery area until the anesthesia has worn off. Your blood pressure and pulse will be checked frequently.  · You will be given antibiotics to prevent infection.  · You may continue to receive fluids through an intravenous (IV) tube while you  "are still in the hospital.  · Pain after surgery is normal. You will be given pain medicine.  · You will be taught how to move correctly and how to stand and walk. While in bed, you will be instructed to turn frequently, using a \"log rolling\" technique, in which the entire body is moved without twisting the back.     This information is not intended to replace advice given to you by your health care provider. Make sure you discuss any questions you have with your health care provider.     Document Released: 09/15/2004 Document Revised: 03/11/2013 Document Reviewed: 06/01/2016  [employer] Interactive Patient Education ©2016 [employer]    Spinal Fusion, Care After  Refer to this sheet in the next few weeks. These instructions provide you with information on caring for yourself after your procedure. Your caregiver may also give you more specific instructions. Your treatment has been planned according to current medical practices, but problems sometimes occur. Call your caregiver if you have any problems or questions after your procedure.  HOME CARE INSTRUCTIONS   · Take whatever pain medicine has been prescribed by your caregiver. Do not take over-the-counter pain medicine unless directed otherwise by your caregiver.  · Do not drive if you are taking narcotic pain medicines.  · Change your bandage (dressing) if necessary or as directed by your caregiver.  · Do not get your surgical cut (incision) wet. After a few days you may take quick showers (rather than baths), but keep your incision clean and dry. Covering the incision with plastic wrap while you shower should keep your incision dry. A few weeks after surgery, once your incision has healed and your caregiver says it is okay, you can take baths or go swimming.  · If you have been prescribed medicine to prevent your blood from clotting, follow the directions carefully.  · Check the area around your incision often. Look for redness and swelling. Also, look for " anything leaking from your wound. You can use a mirror or have a family member inspect your incision if it is in a place where it is difficult for you to see.  · Ask your caregiver what activities you should avoid and for how long.  · Walk as much as possible.  · Do not lift anything heavier than 10 pounds (4.5 kilograms) until your caregiver says it is safe.  · Do not twist or bend for a few weeks. Try not to pull on things. Avoid sitting for long periods of time. Change positions at least every hour.  · Ask your caregiver what kinds of exercise you should do to make your back stronger and when you should begin doing these exercises.  SEEK IMMEDIATE MEDICAL CARE IF:   · Pain suddenly becomes much worse.  · The incision area is red, swollen, bleeding, or leaking fluid.  · Your legs or feet become increasingly painful, numb, weak, or swollen.  · You have trouble controlling urination or bowel movements.  · You have trouble breathing.  · You have chest pain.  · You have a fever.  MAKE SURE YOU:  · Understand these instructions.  · Will watch your condition.  · Will get help right away if you are not doing well or get worse.     This information is not intended to replace advice given to you by your health care provider. Make sure you discuss any questions you have with your health care provider.     Document Released: 07/07/2006 Document Revised: 01/08/2016 Document Reviewed: 06/01/2016  [employer] Interactive Patient Education ©2016 [employer]      If you have any questions in regards to your visit with  today, please do not hesitate to contact our office. Ask to speak with a [facility] for any clinical questions you may have.    Marta Sanches CMA    [phone]        Patient Instructions History      Upcoming Appointments     Visit Type Date Time Department    NEW PATIENT 1/17/2017  3:00 PM [facility]    OFFICE VISIT 1/31/2017  2:00 PM [facility]    PAT 1/31/2017  3:00 PM [facility] PREADMISSION  T    FOLLOW UP 1 UNIT 2017  3:00 PM [facility]      [employer] Signup     [facility] [email or web address] allows you to send messages to your doctor, view your test results, renew your prescriptions, schedule appointments, and more. To sign up, go to [employer] and click on the Sign Up Now link in the New User? box. Enter your [email or web address] Activation Code exactly as it appears below along with the last four digits of your Social Security Number and your Date of Birth () to complete the sign-up process. If you do not sign up before the expiration date, you must request a new code.    [email or web address] Activation Code: [email or web address]  Expires: 2017  2:32 PM    If you have questions, you can email [email or web address] or call [phone] to talk to our [email or web address] staff. Remember, [email or web address] is NOT to be used for urgent needs. For medical emergencies, dial 911.               Other Info from Your Visit           Your Appointments     2017  3:00 PM EST   New Patient with Jurgen Umana MD   [facility] (--)    [address]  [facility] [address] [phone]           Bring all previous medical records and films, along with current medications and insurance information.            2017  2:00 PM EST   Office Visit with PRE-OP ROOM OBGYN [facility] (--)    [address]  [facility] [address] [phone]           Arrive 15 minutes prior to appointment.            2017  3:00 PM EST   Pre-Admission Testing with [facility] PAT 3   [facility]READMISS[facility] T ([place])    [address] [address] [phone]            2017  3:00 PM EST   FOLLOW UP with Kaley De La Paz MD   [facility] HEMATOLOG[facility]  [facility]COL[facility] ([place])    [address]  [facility] [id number] [phone]             "  Allergies     Lortab  [Hydrocodone-acetaminophen]  Nausea Only    Tetanus Toxoids      Erythromycin  Nausea And Vomiting      Reason for Visit     Back Pain     Leg Pain     Numbness     Tingling           Vital Signs     Height Weight Body Mass Index Smoking Status          59\" (149.9 cm) 164 lb (74.4 kg) 33.12 kg/m2 Current Every Day Smoker        Problems and Diagnoses Noted     Spondylolisthesis of lumbar region        "

## 2017-01-18 ENCOUNTER — PREP FOR SURGERY (OUTPATIENT)
Dept: OBSTETRICS AND GYNECOLOGY | Facility: CLINIC | Age: 47
End: 2017-01-18

## 2017-01-18 DIAGNOSIS — N92.1 MENORRHAGIA WITH IRREGULAR CYCLE: Primary | ICD-10-CM

## 2017-01-18 RX ORDER — SODIUM CHLORIDE 0.9 % (FLUSH) 0.9 %
1-10 SYRINGE (ML) INJECTION AS NEEDED
Status: CANCELLED | OUTPATIENT
Start: 2017-01-18

## 2017-01-18 RX ORDER — PHENAZOPYRIDINE HYDROCHLORIDE 100 MG/1
200 TABLET, FILM COATED ORAL ONCE
Status: CANCELLED | OUTPATIENT
Start: 2017-01-18 | End: 2017-01-18

## 2017-01-31 ENCOUNTER — HOSPITAL ENCOUNTER (OUTPATIENT)
Dept: GENERAL RADIOLOGY | Facility: HOSPITAL | Age: 47
Discharge: HOME | End: 2017-01-31
Admitting: OBSTETRICS & GYNECOLOGY

## 2017-01-31 ENCOUNTER — APPOINTMENT (OUTPATIENT)
Dept: PREADMISSION TESTING | Facility: HOSPITAL | Age: 47
End: 2017-01-31

## 2017-01-31 VITALS — WEIGHT: 150 LBS | BODY MASS INDEX: 31.49 KG/M2 | HEIGHT: 58 IN

## 2017-01-31 LAB
ANION GAP SERPL CALCULATED.3IONS-SCNC: 15.1 MMOL/L
BACTERIA UR QL AUTO: ABNORMAL /HPF
BILIRUB UR QL STRIP: NEGATIVE
BUN BLD-MCNC: 10 MG/DL (ref 7–20)
BUN/CREAT SERPL: 16.7 (ref 7.1–23.5)
CALCIUM SPEC-SCNC: 9.6 MG/DL (ref 8.4–10.2)
CHLORIDE SERPL-SCNC: 105 MMOL/L (ref 98–107)
CLARITY UR: CLEAR
CO2 SERPL-SCNC: 28 MMOL/L (ref 26–30)
COLOR UR: YELLOW
CREAT BLD-MCNC: 0.6 MG/DL (ref 0.6–1.3)
DEPRECATED RDW RBC AUTO: 46.8 FL (ref 37–54)
ERYTHROCYTE [DISTWIDTH] IN BLOOD BY AUTOMATED COUNT: 13 % (ref 11.5–14.5)
GFR SERPL CREATININE-BSD FRML MDRD: 108 ML/MIN/1.73
GLUCOSE BLD-MCNC: 84 MG/DL (ref 74–98)
GLUCOSE UR STRIP-MCNC: NEGATIVE MG/DL
HCT VFR BLD AUTO: 44.2 % (ref 37–47)
HGB BLD-MCNC: 15.1 G/DL (ref 12–16)
HGB UR QL STRIP.AUTO: ABNORMAL
HYALINE CASTS UR QL AUTO: ABNORMAL /LPF
KETONES UR QL STRIP: NEGATIVE
LEUKOCYTE ESTERASE UR QL STRIP.AUTO: NEGATIVE
MCH RBC QN AUTO: 33.3 PG (ref 27–31)
MCHC RBC AUTO-ENTMCNC: 34.2 G/DL (ref 30–37)
MCV RBC AUTO: 97.6 FL (ref 81–99)
NITRITE UR QL STRIP: NEGATIVE
PH UR STRIP.AUTO: 7 [PH] (ref 5–8)
PLATELET # BLD AUTO: 424 10*3/MM3 (ref 130–400)
PMV BLD AUTO: 9.6 FL (ref 6–12)
POTASSIUM BLD-SCNC: 4.1 MMOL/L (ref 3.5–5.1)
PROT UR QL STRIP: NEGATIVE
RBC # BLD AUTO: 4.53 10*6/MM3 (ref 4.2–5.4)
RBC # UR: ABNORMAL /HPF
REF LAB TEST METHOD: ABNORMAL
SODIUM BLD-SCNC: 144 MMOL/L (ref 137–145)
SP GR UR STRIP: 1.01 (ref 1–1.03)
SQUAMOUS #/AREA URNS HPF: ABNORMAL /HPF
UROBILINOGEN UR QL STRIP: ABNORMAL
WBC NRBC COR # BLD: 13.15 10*3/MM3 (ref 4.8–10.8)
WBC UR QL AUTO: ABNORMAL /HPF

## 2017-01-31 PROCEDURE — 71010 HC CHEST PA OR AP: CPT

## 2017-01-31 PROCEDURE — 36415 COLL VENOUS BLD VENIPUNCTURE: CPT

## 2017-01-31 PROCEDURE — 93005 ELECTROCARDIOGRAM TRACING: CPT | Performed by: OBSTETRICS & GYNECOLOGY

## 2017-01-31 PROCEDURE — 85027 COMPLETE CBC AUTOMATED: CPT | Performed by: OBSTETRICS & GYNECOLOGY

## 2017-01-31 PROCEDURE — 80048 BASIC METABOLIC PNL TOTAL CA: CPT | Performed by: OBSTETRICS & GYNECOLOGY

## 2017-01-31 PROCEDURE — 81001 URINALYSIS AUTO W/SCOPE: CPT | Performed by: OBSTETRICS & GYNECOLOGY

## 2017-01-31 RX ORDER — HYDROCHLOROTHIAZIDE 25 MG/1
25 TABLET ORAL DAILY
COMMUNITY

## 2017-02-04 NOTE — H&P
Mika  Addie Camarillo  : 1970  MRN: [id number]  CSN: [id number]    History and Physical    Subjective   Addie Camarillo is a 46 y.o. year old  who present for surgery due to long history of menometorrhagia, dysmenorrhea, and dyspareunia.  Pt has also been followed with persistent left adnexal lesion, probable hydrosalpinx.  Pt reports chronic pelvic pain is affecting quality of life.  Pt desires definitive surgical treatment to include LAVH/BSO.  Pt reports at time of previous tubal physician had trouble findings tubes.  Pt understands increased risk for laparotomy.  Previous TVS showed heterogenous changes of uterus with possible fibroid change as wel as persistent adnexal mass.  Pt has failed conservative medical management and desires above intervention.    History  Past Medical History   Diagnosis Date   • Arthritis    • COPD (chronic obstructive pulmonary disease)    • Full dentures    • GERD (gastroesophageal reflux disease)    • Heart attack    • Hyperlipidemia    • Hypertension    • Hypothyroid    • Kidney stone      No current facility-administered medications on file prior to encounter.      Current Outpatient Prescriptions on File Prior to Encounter   Medication Sig Dispense Refill   • albuterol (PROVENTIL HFA) 108 (90 BASE) MCG/ACT inhaler every 6 (six) hours.     • baclofen (LIORESAL) 20 MG tablet Take 20 mg by mouth 3 (Three) Times a Day.  0   • ibuprofen (ADVIL,MOTRIN) 600 MG tablet   0   • levothyroxine (SYNTHROID, LEVOTHROID) 75 MCG tablet Take 75 mcg by mouth Daily.  0   • omeprazole (PriLOSEC) 20 MG capsule Take 20 mg by mouth Daily.  0   • valsartan-hydrochlorothiazide (DIOVAN-HCT) 320-12.5 MG per tablet Take 1 tablet by mouth Daily.  0     Allergies   Allergen Reactions   • Lortab  [Hydrocodone-Acetaminophen] Nausea Only   • Tetanus Toxoids    • Erythromycin Nausea And Vomiting     Past Surgical History   Procedure Laterality Date   • Cholecystectomy with intraoperative  "cholangiogram     • Laparoscopic tubal ligation     • Teeth extraction       Family History   Problem Relation Age of Onset   • Cancer Father      Social History     Social History   • Marital status:      Spouse name: N/A   • Number of children: N/A   • Years of education: N/A     Social History Main Topics   • Smoking status: Current Every Day Smoker     Packs/day: 0.50     Years: 16.00     Types: Cigarettes   • Smokeless tobacco: Never Used   • Alcohol use No   • Drug use: 14.00 per week     Special: Marijuana      Comment: 1 to 2 a day - 15 years   • Sexual activity: Defer     Other Topics Concern   • Not on file     Social History Narrative     Review of Systems  All systems were reviewed and negative except for:  Genitourinary: postivie for  pelvic pain, bleeding, and dysmenorrhea  Musculoskeletal: positive for  back pain and joint pain     Objective  VS /60 Weight 155 lb Height 4'10\"  Physical Exam:  General Appearance: alert, appears stated age and cooperative  Head: normocephalic, without obvious abnormality and atraumatic  Eyes: lids and lashes normal, conjunctivae and sclerae normal, no icterus, no pallor, corneas clear and PERRLA  Neck: suppple, trachea midline and no thyromegaly  Lungs: clear to auscultation, respirations regular, respirations even and respirations unlabored  Heart: regular rhythm & normal rate, normal S1, S2, no murmur, no manuel, no rub and no click  Abdomen: normal bowel sounds, no masses, no hepatomegaly, no splenomegaly, soft non-tender, no guarding and no rebound tenderness  Pelvic: Not performed.  Extremities: moves extremities well, no edema, no cyanosis and no redness    Labs  Lab Results   Component Value Date     (H) 01/31/2017    HGB 15.1 01/31/2017    HCT 44.2 01/31/2017    WBC 13.15 (H) 01/31/2017     01/31/2017    K 4.1 01/31/2017     01/31/2017    CO2 28.0 01/31/2017    BUN 10 01/31/2017    CREATININE 0.60 01/31/2017    GLUCOSE 84 " 01/31/2017    ALBUMIN 4.20 07/22/2016    CALCIUM 9.6 01/31/2017    AST 15 07/22/2016    ALT 18 07/22/2016    BILITOT 0.2 (L) 07/22/2016        Assessment   1. Menometorrhagia with dysmenorrhea and fibroid changes  2. Chronic pelvic pain  3. Persistent adnexal mass     Plan   1. LAVH/BSO with cystoscopy  2. Risks/complications/benefits and other alternatives discussed at length on multiple occasions.  All questions answered and pt in agreement with above plan.    Soledad Cox MD  2/4/2017  6:09 PM

## 2017-02-07 ENCOUNTER — HOSPITAL ENCOUNTER (OUTPATIENT)
Facility: HOSPITAL | Age: 47
Discharge: HOME | End: 2017-02-08
Attending: OBSTETRICS & GYNECOLOGY | Admitting: OBSTETRICS & GYNECOLOGY

## 2017-02-07 ENCOUNTER — ANESTHESIA (OUTPATIENT)
Dept: PERIOP | Facility: HOSPITAL | Age: 47
End: 2017-02-07

## 2017-02-07 ENCOUNTER — ANESTHESIA EVENT (OUTPATIENT)
Dept: PERIOP | Facility: HOSPITAL | Age: 47
End: 2017-02-07

## 2017-02-07 DIAGNOSIS — N92.1 MENORRHAGIA WITH IRREGULAR CYCLE: ICD-10-CM

## 2017-02-07 PROBLEM — N92.0 MENORRHAGIA: Status: ACTIVE | Noted: 2017-02-07

## 2017-02-07 LAB
ABO GROUP BLD: NORMAL
ABO GROUP BLD: NORMAL
B-HCG UR QL: NEGATIVE
BLD GP AB SCN SERPL QL: NEGATIVE
RH BLD: POSITIVE
RH BLD: POSITIVE

## 2017-02-07 PROCEDURE — 86900 BLOOD TYPING SEROLOGIC ABO: CPT

## 2017-02-07 PROCEDURE — 25010000002 MIDAZOLAM PER 1 MG

## 2017-02-07 PROCEDURE — 58552 LAPARO-VAG HYST INCL T/O: CPT | Performed by: OBSTETRICS & GYNECOLOGY

## 2017-02-07 PROCEDURE — 25010000002 MORPHINE PER 10 MG

## 2017-02-07 PROCEDURE — 25010000002 FENTANYL CITRATE (PF) 250 MCG/5ML SOLUTION: Performed by: NURSE ANESTHETIST, CERTIFIED REGISTERED

## 2017-02-07 PROCEDURE — 25010000002 PROPOFOL 200 MG/20ML EMULSION: Performed by: NURSE ANESTHETIST, CERTIFIED REGISTERED

## 2017-02-07 PROCEDURE — 25010000002 HYDROMORPHONE PER 4 MG: Performed by: NURSE ANESTHETIST, CERTIFIED REGISTERED

## 2017-02-07 PROCEDURE — 25010000003 MORPHINE PER 10 MG: Performed by: OBSTETRICS & GYNECOLOGY

## 2017-02-07 PROCEDURE — 25010000002 DEXAMETHASONE PER 1 MG: Performed by: NURSE ANESTHETIST, CERTIFIED REGISTERED

## 2017-02-07 PROCEDURE — 86901 BLOOD TYPING SEROLOGIC RH(D): CPT

## 2017-02-07 PROCEDURE — 81025 URINE PREGNANCY TEST: CPT | Performed by: OBSTETRICS & GYNECOLOGY

## 2017-02-07 PROCEDURE — 25010000002 FUROSEMIDE PER 20 MG: Performed by: NURSE ANESTHETIST, CERTIFIED REGISTERED

## 2017-02-07 PROCEDURE — 25010000002 MIDAZOLAM PER 1 MG: Performed by: NURSE ANESTHETIST, CERTIFIED REGISTERED

## 2017-02-07 PROCEDURE — 25010000002 ONDANSETRON PER 1 MG: Performed by: NURSE ANESTHETIST, CERTIFIED REGISTERED

## 2017-02-07 PROCEDURE — 86850 RBC ANTIBODY SCREEN: CPT

## 2017-02-07 PROCEDURE — 25010000003 CEFAZOLIN PER 500 MG: Performed by: OBSTETRICS & GYNECOLOGY

## 2017-02-07 RX ORDER — ZOLPIDEM TARTRATE 5 MG/1
10 TABLET ORAL NIGHTLY PRN
Status: DISCONTINUED | OUTPATIENT
Start: 2017-02-07 | End: 2017-02-08 | Stop reason: HOSPADM

## 2017-02-07 RX ORDER — MAGNESIUM HYDROXIDE/ALUMINUM HYDROXICE/SIMETHICONE 120; 1200; 1200 MG/30ML; MG/30ML; MG/30ML
15 SUSPENSION ORAL EVERY 6 HOURS
Status: DISCONTINUED | OUTPATIENT
Start: 2017-02-07 | End: 2017-02-08 | Stop reason: HOSPADM

## 2017-02-07 RX ORDER — HYDROMORPHONE HCL 110MG/55ML
PATIENT CONTROLLED ANALGESIA SYRINGE INTRAVENOUS AS NEEDED
Status: DISCONTINUED | OUTPATIENT
Start: 2017-02-07 | End: 2017-02-07 | Stop reason: SURG

## 2017-02-07 RX ORDER — ONDANSETRON 2 MG/ML
4 INJECTION INTRAMUSCULAR; INTRAVENOUS EVERY 6 HOURS PRN
Status: DISCONTINUED | OUTPATIENT
Start: 2017-02-07 | End: 2017-02-08 | Stop reason: HOSPADM

## 2017-02-07 RX ORDER — DIPHENHYDRAMINE HYDROCHLORIDE 50 MG/ML
25 INJECTION INTRAMUSCULAR; INTRAVENOUS EVERY 6 HOURS PRN
Status: DISCONTINUED | OUTPATIENT
Start: 2017-02-07 | End: 2017-02-08 | Stop reason: HOSPADM

## 2017-02-07 RX ORDER — PROMETHAZINE HYDROCHLORIDE 25 MG/ML
12.5 INJECTION, SOLUTION INTRAMUSCULAR; INTRAVENOUS EVERY 4 HOURS PRN
Status: DISCONTINUED | OUTPATIENT
Start: 2017-02-07 | End: 2017-02-08 | Stop reason: HOSPADM

## 2017-02-07 RX ORDER — ALBUTEROL SULFATE 90 UG/1
AEROSOL, METERED RESPIRATORY (INHALATION) AS NEEDED
Status: DISCONTINUED | OUTPATIENT
Start: 2017-02-07 | End: 2017-02-07 | Stop reason: SURG

## 2017-02-07 RX ORDER — PROMETHAZINE HYDROCHLORIDE 25 MG/ML
12.5 INJECTION, SOLUTION INTRAMUSCULAR; INTRAVENOUS EVERY 6 HOURS PRN
Status: DISCONTINUED | OUTPATIENT
Start: 2017-02-07 | End: 2017-02-08 | Stop reason: HOSPADM

## 2017-02-07 RX ORDER — DEXAMETHASONE SODIUM PHOSPHATE 4 MG/ML
INJECTION, SOLUTION INTRA-ARTICULAR; INTRALESIONAL; INTRAMUSCULAR; INTRAVENOUS; SOFT TISSUE AS NEEDED
Status: DISCONTINUED | OUTPATIENT
Start: 2017-02-07 | End: 2017-02-07 | Stop reason: SURG

## 2017-02-07 RX ORDER — FENTANYL CITRATE 50 UG/ML
INJECTION, SOLUTION INTRAMUSCULAR; INTRAVENOUS AS NEEDED
Status: DISCONTINUED | OUTPATIENT
Start: 2017-02-07 | End: 2017-02-07 | Stop reason: SURG

## 2017-02-07 RX ORDER — NALOXONE HCL 0.4 MG/ML
0.4 VIAL (ML) INJECTION
Status: DISCONTINUED | OUTPATIENT
Start: 2017-02-07 | End: 2017-02-08 | Stop reason: HOSPADM

## 2017-02-07 RX ORDER — ROCURONIUM BROMIDE 10 MG/ML
INJECTION, SOLUTION INTRAVENOUS AS NEEDED
Status: DISCONTINUED | OUTPATIENT
Start: 2017-02-07 | End: 2017-02-07 | Stop reason: SURG

## 2017-02-07 RX ORDER — BISACODYL 10 MG
10 SUPPOSITORY, RECTAL RECTAL DAILY PRN
Status: DISCONTINUED | OUTPATIENT
Start: 2017-02-07 | End: 2017-02-08 | Stop reason: HOSPADM

## 2017-02-07 RX ORDER — MORPHINE SULFATE 2 MG/ML
1 INJECTION, SOLUTION INTRAMUSCULAR; INTRAVENOUS EVERY 4 HOURS PRN
Status: DISCONTINUED | OUTPATIENT
Start: 2017-02-07 | End: 2017-02-08 | Stop reason: HOSPADM

## 2017-02-07 RX ORDER — OXYCODONE HYDROCHLORIDE AND ACETAMINOPHEN 5; 325 MG/1; MG/1
2 TABLET ORAL EVERY 4 HOURS PRN
Status: DISCONTINUED | OUTPATIENT
Start: 2017-02-07 | End: 2017-02-08 | Stop reason: HOSPADM

## 2017-02-07 RX ORDER — CALCIUM CARBONATE 200(500)MG
2 TABLET,CHEWABLE ORAL 3 TIMES DAILY PRN
Status: DISCONTINUED | OUTPATIENT
Start: 2017-02-07 | End: 2017-02-08 | Stop reason: HOSPADM

## 2017-02-07 RX ORDER — ONDANSETRON 4 MG/1
4 TABLET, ORALLY DISINTEGRATING ORAL EVERY 6 HOURS PRN
Status: DISCONTINUED | OUTPATIENT
Start: 2017-02-07 | End: 2017-02-08 | Stop reason: HOSPADM

## 2017-02-07 RX ORDER — PHENAZOPYRIDINE HYDROCHLORIDE 100 MG/1
200 TABLET, FILM COATED ORAL ONCE
Status: COMPLETED | OUTPATIENT
Start: 2017-02-07 | End: 2017-02-07

## 2017-02-07 RX ORDER — PHENAZOPYRIDINE HYDROCHLORIDE 100 MG/1
TABLET, FILM COATED ORAL
Status: COMPLETED
Start: 2017-02-07 | End: 2017-02-07

## 2017-02-07 RX ORDER — FENTANYL CITRATE 50 UG/ML
INJECTION, SOLUTION INTRAMUSCULAR; INTRAVENOUS AS NEEDED
Status: DISCONTINUED | OUTPATIENT
Start: 2017-02-07 | End: 2017-02-07

## 2017-02-07 RX ORDER — DEXTROSE AND SODIUM CHLORIDE 5; .45 G/100ML; G/100ML
125 INJECTION, SOLUTION INTRAVENOUS CONTINUOUS
Status: DISCONTINUED | OUTPATIENT
Start: 2017-02-07 | End: 2017-02-08 | Stop reason: HOSPADM

## 2017-02-07 RX ORDER — NALOXONE HCL 0.4 MG/ML
0.1 VIAL (ML) INJECTION
Status: DISCONTINUED | OUTPATIENT
Start: 2017-02-07 | End: 2017-02-08 | Stop reason: HOSPADM

## 2017-02-07 RX ORDER — ALBUTEROL SULFATE 90 UG/1
2 AEROSOL, METERED RESPIRATORY (INHALATION) EVERY 6 HOURS PRN
Status: DISCONTINUED | OUTPATIENT
Start: 2017-02-07 | End: 2017-02-08 | Stop reason: HOSPADM

## 2017-02-07 RX ORDER — PROPOFOL 10 MG/ML
INJECTION, EMULSION INTRAVENOUS AS NEEDED
Status: DISCONTINUED | OUTPATIENT
Start: 2017-02-07 | End: 2017-02-07 | Stop reason: SURG

## 2017-02-07 RX ORDER — MIDAZOLAM HYDROCHLORIDE 1 MG/ML
2 INJECTION INTRAMUSCULAR; INTRAVENOUS ONCE AS NEEDED
Status: COMPLETED | OUTPATIENT
Start: 2017-02-07 | End: 2017-02-07

## 2017-02-07 RX ORDER — SODIUM CHLORIDE, SODIUM LACTATE, POTASSIUM CHLORIDE, CALCIUM CHLORIDE 600; 310; 30; 20 MG/100ML; MG/100ML; MG/100ML; MG/100ML
1000 INJECTION, SOLUTION INTRAVENOUS CONTINUOUS PRN
Status: DISCONTINUED | OUTPATIENT
Start: 2017-02-07 | End: 2017-02-07 | Stop reason: HOSPADM

## 2017-02-07 RX ORDER — MAGNESIUM HYDROXIDE 1200 MG/15ML
LIQUID ORAL AS NEEDED
Status: DISCONTINUED | OUTPATIENT
Start: 2017-02-07 | End: 2017-02-07 | Stop reason: HOSPADM

## 2017-02-07 RX ORDER — ONDANSETRON 4 MG/1
4 TABLET, FILM COATED ORAL EVERY 6 HOURS PRN
Status: DISCONTINUED | OUTPATIENT
Start: 2017-02-07 | End: 2017-02-08 | Stop reason: HOSPADM

## 2017-02-07 RX ORDER — IBUPROFEN 600 MG/1
600 TABLET ORAL EVERY 6 HOURS PRN
Status: DISCONTINUED | OUTPATIENT
Start: 2017-02-08 | End: 2017-02-08 | Stop reason: HOSPADM

## 2017-02-07 RX ORDER — LABETALOL HYDROCHLORIDE 5 MG/ML
INJECTION, SOLUTION INTRAVENOUS AS NEEDED
Status: DISCONTINUED | OUTPATIENT
Start: 2017-02-07 | End: 2017-02-07 | Stop reason: SURG

## 2017-02-07 RX ORDER — MIDAZOLAM HYDROCHLORIDE 1 MG/ML
INJECTION INTRAMUSCULAR; INTRAVENOUS
Status: COMPLETED
Start: 2017-02-07 | End: 2017-02-07

## 2017-02-07 RX ORDER — NEOSTIGMINE METHYLSULFATE 5 MG/5 ML
SYRINGE (ML) INTRAVENOUS AS NEEDED
Status: DISCONTINUED | OUTPATIENT
Start: 2017-02-07 | End: 2017-02-07 | Stop reason: SURG

## 2017-02-07 RX ORDER — MORPHINE SULFATE/0.9% NACL/PF 1 MG/ML
SYRINGE (ML) INJECTION
Status: DISPENSED
Start: 2017-02-07 | End: 2017-02-07

## 2017-02-07 RX ORDER — PROMETHAZINE HYDROCHLORIDE 25 MG/1
12.5 SUPPOSITORY RECTAL EVERY 6 HOURS PRN
Status: DISCONTINUED | OUTPATIENT
Start: 2017-02-07 | End: 2017-02-08 | Stop reason: HOSPADM

## 2017-02-07 RX ORDER — PROMETHAZINE HYDROCHLORIDE 12.5 MG/1
12.5 TABLET ORAL EVERY 6 HOURS PRN
Status: DISCONTINUED | OUTPATIENT
Start: 2017-02-07 | End: 2017-02-08 | Stop reason: HOSPADM

## 2017-02-07 RX ORDER — MORPHINE SULFATE 2 MG/ML
2 INJECTION, SOLUTION INTRAMUSCULAR; INTRAVENOUS EVERY 4 HOURS PRN
Status: DISCONTINUED | OUTPATIENT
Start: 2017-02-07 | End: 2017-02-08 | Stop reason: HOSPADM

## 2017-02-07 RX ORDER — DOCUSATE SODIUM 100 MG/1
100 CAPSULE, LIQUID FILLED ORAL 2 TIMES DAILY PRN
Status: DISCONTINUED | OUTPATIENT
Start: 2017-02-07 | End: 2017-02-08 | Stop reason: HOSPADM

## 2017-02-07 RX ORDER — GLYCOPYRROLATE 0.2 MG/ML
INJECTION INTRAMUSCULAR; INTRAVENOUS AS NEEDED
Status: DISCONTINUED | OUTPATIENT
Start: 2017-02-07 | End: 2017-02-07 | Stop reason: SURG

## 2017-02-07 RX ORDER — SODIUM CHLORIDE 0.9 % (FLUSH) 0.9 %
1-10 SYRINGE (ML) INJECTION AS NEEDED
Status: DISCONTINUED | OUTPATIENT
Start: 2017-02-07 | End: 2017-02-07 | Stop reason: HOSPADM

## 2017-02-07 RX ORDER — MORPHINE SULFATE/0.9% NACL/PF 1 MG/ML
SYRINGE (ML) INJECTION
Status: COMPLETED
Start: 2017-02-07 | End: 2017-02-07

## 2017-02-07 RX ORDER — FUROSEMIDE 10 MG/ML
INJECTION INTRAMUSCULAR; INTRAVENOUS AS NEEDED
Status: DISCONTINUED | OUTPATIENT
Start: 2017-02-07 | End: 2017-02-07 | Stop reason: SURG

## 2017-02-07 RX ORDER — MORPHINE SULFATE 1 MG/ML
INJECTION INTRAVENOUS CONTINUOUS
Status: DISCONTINUED | OUTPATIENT
Start: 2017-02-07 | End: 2017-02-08 | Stop reason: HOSPADM

## 2017-02-07 RX ORDER — FAMOTIDINE 20 MG/1
20 TABLET, FILM COATED ORAL EVERY 12 HOURS SCHEDULED
Status: DISCONTINUED | OUTPATIENT
Start: 2017-02-07 | End: 2017-02-08 | Stop reason: HOSPADM

## 2017-02-07 RX ORDER — SIMETHICONE 80 MG
80 TABLET,CHEWABLE ORAL 4 TIMES DAILY PRN
Status: DISCONTINUED | OUTPATIENT
Start: 2017-02-07 | End: 2017-02-08 | Stop reason: HOSPADM

## 2017-02-07 RX ORDER — MORPHINE SULFATE 1 MG/ML
INJECTION INTRAVENOUS CONTINUOUS
Status: DISCONTINUED | OUTPATIENT
Start: 2017-02-07 | End: 2017-02-07

## 2017-02-07 RX ORDER — ONDANSETRON 2 MG/ML
INJECTION INTRAMUSCULAR; INTRAVENOUS AS NEEDED
Status: DISCONTINUED | OUTPATIENT
Start: 2017-02-07 | End: 2017-02-07 | Stop reason: SURG

## 2017-02-07 RX ADMIN — Medication: at 23:25

## 2017-02-07 RX ADMIN — FUROSEMIDE 10 MG: 10 INJECTION, SOLUTION INTRAMUSCULAR; INTRAVENOUS at 10:20

## 2017-02-07 RX ADMIN — ALBUTEROL SULFATE 4 PUFF: 90 AEROSOL, METERED RESPIRATORY (INHALATION) at 09:14

## 2017-02-07 RX ADMIN — FENTANYL CITRATE 100 MCG: 50 INJECTION, SOLUTION INTRAMUSCULAR; INTRAVENOUS at 09:39

## 2017-02-07 RX ADMIN — SODIUM CHLORIDE, POTASSIUM CHLORIDE, SODIUM LACTATE AND CALCIUM CHLORIDE: 600; 310; 30; 20 INJECTION, SOLUTION INTRAVENOUS at 09:41

## 2017-02-07 RX ADMIN — CEFAZOLIN SODIUM 2 G: 2 SOLUTION INTRAVENOUS at 09:06

## 2017-02-07 RX ADMIN — Medication: at 12:32

## 2017-02-07 RX ADMIN — SODIUM CHLORIDE, POTASSIUM CHLORIDE, SODIUM LACTATE AND CALCIUM CHLORIDE: 600; 310; 30; 20 INJECTION, SOLUTION INTRAVENOUS at 10:30

## 2017-02-07 RX ADMIN — MIDAZOLAM HYDROCHLORIDE 2 MG: 1 INJECTION, SOLUTION INTRAMUSCULAR; INTRAVENOUS at 09:00

## 2017-02-07 RX ADMIN — ONDANSETRON 4 MG: 2 INJECTION INTRAMUSCULAR; INTRAVENOUS at 11:05

## 2017-02-07 RX ADMIN — Medication 4 MG: at 10:52

## 2017-02-07 RX ADMIN — HYDROMORPHONE HYDROCHLORIDE 1 MG: 2 INJECTION, SOLUTION INTRAMUSCULAR; INTRAVENOUS; SUBCUTANEOUS at 10:40

## 2017-02-07 RX ADMIN — LABETALOL HYDROCHLORIDE 10 MG: 5 INJECTION, SOLUTION INTRAVENOUS at 09:35

## 2017-02-07 RX ADMIN — GLYCOPYRROLATE 0.8 MG: 0.2 INJECTION, SOLUTION INTRAMUSCULAR; INTRAVENOUS at 10:52

## 2017-02-07 RX ADMIN — PHENAZOPYRIDINE HYDROCHLORIDE 200 MG: 100 TABLET, FILM COATED ORAL at 08:28

## 2017-02-07 RX ADMIN — LIDOCAINE HYDROCHLORIDE 40 MG: 20 INJECTION, SOLUTION INTRAVENOUS at 09:12

## 2017-02-07 RX ADMIN — LABETALOL HYDROCHLORIDE 10 MG: 5 INJECTION, SOLUTION INTRAVENOUS at 09:40

## 2017-02-07 RX ADMIN — FENTANYL CITRATE 50 MCG: 50 INJECTION, SOLUTION INTRAMUSCULAR; INTRAVENOUS at 09:35

## 2017-02-07 RX ADMIN — HYDROMORPHONE HYDROCHLORIDE 1 MG: 2 INJECTION, SOLUTION INTRAMUSCULAR; INTRAVENOUS; SUBCUTANEOUS at 10:46

## 2017-02-07 RX ADMIN — DEXAMETHASONE SODIUM PHOSPHATE 8 MG: 4 INJECTION, SOLUTION INTRAMUSCULAR; INTRAVENOUS at 11:05

## 2017-02-07 RX ADMIN — MIDAZOLAM HYDROCHLORIDE 2 MG: 1 INJECTION, SOLUTION INTRAMUSCULAR; INTRAVENOUS at 08:52

## 2017-02-07 RX ADMIN — ROCURONIUM BROMIDE 40 MG: 10 INJECTION INTRAVENOUS at 09:12

## 2017-02-07 RX ADMIN — PROPOFOL 150 MG: 10 INJECTION, EMULSION INTRAVENOUS at 09:12

## 2017-02-07 RX ADMIN — PHENAZOPYRIDINE 200 MG: 100 TABLET ORAL at 08:28

## 2017-02-07 RX ADMIN — DEXTROSE AND SODIUM CHLORIDE 125 ML/HR: 5; 450 INJECTION, SOLUTION INTRAVENOUS at 20:30

## 2017-02-07 RX ADMIN — FENTANYL CITRATE 100 MCG: 50 INJECTION, SOLUTION INTRAMUSCULAR; INTRAVENOUS at 09:12

## 2017-02-07 RX ADMIN — DEXTROSE AND SODIUM CHLORIDE 125 ML/HR: 5; 450 INJECTION, SOLUTION INTRAVENOUS at 13:21

## 2017-02-07 RX ADMIN — SODIUM CHLORIDE, POTASSIUM CHLORIDE, SODIUM LACTATE AND CALCIUM CHLORIDE 1000 ML: 600; 310; 30; 20 INJECTION, SOLUTION INTRAVENOUS at 08:27

## 2017-02-07 NOTE — PLAN OF CARE
Problem: Perioperative Period (Adult)  Goal: Signs and Symptoms of Listed Potential Problems Will be Absent or Manageable (Perioperative Period)    02/07/17 0837   Perioperative Period   Problems Present (Perioperative Period) situational response

## 2017-02-07 NOTE — ANESTHESIA PREPROCEDURE EVALUATION
Anesthesia Evaluation     Patient summary reviewed and Nursing notes reviewed   history of anesthetic complications: prolonged sedation  NPO Status: > 8 hours   Airway   TM distance: <3 FB  possible difficult intubation  Dental    (+) upper dentures and lower dentures    Pulmonary    (+) a smoker (1 2 ppd x > 25 years) Current, COPD moderate, shortness of breath, sleep apnea (prob, kamaljit, heavy snoring), decreased breath sounds,   Cardiovascular - normal exam    ECG reviewed    (+) hypertension, past MI (2014 no sx, no cv w/u, ? hx, ekg sr) , CAD,       Neuro/Psych  GI/Hepatic/Renal/Endo    (+)  GERD, liver disease (prob enz induce to drug use hx), chronic renal disease, hypothyroidism,     Musculoskeletal     (+) back pain (lumbar back pain and hips),   Abdominal  - normal exam   Substance History   (+) drug use (hx current use q day)     OB/GYN          Other   (+) arthritis     ROS/Med Hx Other: prob tolerance to anes d/t drug use hx  Noted open sores to face and torso, surgeon aware  Low pain toleerance  ekg sr  cxr nad                        Anesthesia Plan    ASA 3     general     intravenous induction   Anesthetic plan and risks discussed with patient.

## 2017-02-07 NOTE — PLAN OF CARE
Problem: Patient Care Overview (Adult)  Goal: Plan of Care Review  Outcome: Ongoing (interventions implemented as appropriate)    02/07/17 1619   Coping/Psychosocial Response Interventions   Plan Of Care Reviewed With patient   Patient Care Overview   Progress no change   Outcome Evaluation   Outcome Summary/Follow up Plan pt to floor short period, no change observed       Goal: Discharge Needs Assessment  Outcome: Ongoing (interventions implemented as appropriate)    02/07/17 1619   Discharge Needs Assessment   Concerns To Be Addressed no discharge needs identified   Readmission Within The Last 30 Days no previous admission in last 30 days   Equipment Needed After Discharge none   Discharge Disposition home or self-care   Current Health   Anticipated Changes Related to Illness none   Self-Care   Equipment Currently Used at Home none         Problem: Hysterectomy (Adult)  Goal: Signs and Symptoms of Listed Potential Problems Will be Absent or Manageable (Hysterectomy)  Outcome: Ongoing (interventions implemented as appropriate)    02/07/17 1619   Hysterectomy   Problems Assessed (Hysterectomy) all   Problems Present (Hysterectomy) none

## 2017-02-07 NOTE — SIGNIFICANT NOTE
AFTAB IN OR WAITING ROOM, [facility] IN [facility] AND [facility] ON 2ND FLOOR NOTIFIED OF PATIENT ARRIVAL TO PACU

## 2017-02-07 NOTE — PLAN OF CARE
Problem: Perioperative Period (Adult)  Intervention: Promote Normothermia    02/07/17 1105   Cardiac Interventions   Warming Thermoregulation Maintenance warm blankets applied

## 2017-02-07 NOTE — ANESTHESIA POSTPROCEDURE EVALUATION
Patient: Addie Camarillo    Procedure Summary     Date Anesthesia Start Anesthesia Stop Room / Location    02/07/17 0906  [facility] OR 2 / [facility] OR       Procedure Diagnosis Surgeon Provider    LAPAROSCOPIC ASSISTED VAGINAL HYSTERECTOMY BILATERAL SALPINGO OOPHORECTOMY (N/A Abdomen); CYSTOSCOPY (N/A Urethra) Menorrhagia with irregular cycle  (Menorrhagia with irregular cycle [N92.1]) MD Hermes Teixeira CRNA          Anesthesia Type: general  Last vitals  BP      Temp      Pulse     Resp      SpO2        Post Anesthesia Care and Evaluation    Patient location during evaluation: PACU  Patient participation: complete - patient participated  Level of consciousness: sleepy but conscious  Pain score: 3  Pain management: adequate  Airway patency: patent  Anesthetic complications: No anesthetic complications  PONV Status: none  Cardiovascular status: acceptable  Respiratory status: acceptable  Hydration status: acceptable

## 2017-02-07 NOTE — PLAN OF CARE
Problem: Perioperative Period (Adult)  Intervention: Promote Pulmonary Hygiene and Secretion Clearance    02/07/17 1105   Promote Aggressive Pulmonary Hygiene/Secretion Management   Cough And Deep Breathing done with encouragement   Positioning   Head Of Bed (HOB) Position HOB elevated   Activity   Activity Type activity adjusted per tolerance   Incentive Spirometer   Administration (Incentive Spirometer) done with encouragement

## 2017-02-07 NOTE — ANESTHESIA PROCEDURE NOTES
Airway  Urgency: elective      General Information and Staff    Patient location during procedure: OR  CRNA: MICHELLE HYDE    Indications and Patient Condition  Indications for airway management: airway protection    Preoxygenated: yes  Mask difficulty assessment: 1 - vent by mask    Final Airway Details  Final airway type: endotracheal airway      Successful airway: ETT  Cuffed: yes   Successful intubation technique: direct laryngoscopy  Facilitating devices/methods: intubating stylet  Endotracheal tube insertion site: oral  Blade: Andressa  Blade size: #3  ETT size: 7.5 (cuff mov 5ml) mm  Cormack-Lehane Classification: grade I - full view of glottis  Placement verified by: chest auscultation, capnometry and palpation of cuff   Measured from: lips  Number of attempts at approach: 1    Additional Comments  Tolerated intubation without adverse rx

## 2017-02-07 NOTE — PLAN OF CARE
Problem: Perioperative Period (Adult)  Intervention: Monitor/Manage Pain    02/07/17 7435   Safety Interventions   Medication Review/Management medications reviewed   Manage Acute Burn Pain   Bowel Intervention adequate fluid intake promoted   Pain Management Interventions analgesic trial initiated;surgical pain pump maintained

## 2017-02-07 NOTE — OP NOTE
Mika Camarillo  : 1970  MRN: [id number]  CSN: [id number]  Date: 2017    Operative Report        Pre-op Diagnosis:  Menorrhagia with irregular cycle [N92.1]  Dysmenorrhea  Chronic Pelvic Pain  Persistent Left Adnexal mass   Post-op Diagnosis:  Post-Op Diagnosis Codes:     * Menorrhagia with irregular cycle [N92.1]  Same as above   Procedure: Procedure(s):  LAPAROSCOPIC ASSISTED VAGINAL HYSTERECTOMY BILATERAL SALPINGO OOPHORECTOMY  CYSTOSCOPY  Lysis of Adhesions   Surgeon: Surgeon(s):  Soledad Cox MD   Assist: ADELA Kruse   Anesthesia: General   Estimated Blood Loss: 200 mls   ABx: cefazolin 2 gms   Drains:   Padron   Specimens:  Uterus, bilateral ovaries and tubes   Findings: Boggy uterus mildly enlarged, left hydrosalpinx with filmy adhesions   Complications: none     Indications: Pt is 45 yo  with history of menometorrhagia, dysmenorreha, dyspareunia and chronic pelvic pain.  Pt with TVS showing ?fibroid changes. Pt also with persistent left adnexal mass.  Pt desires definitive treatment with hysterectomy and BSO as planned above.  Risks/complications/benefits and other alternatives discussed.     Description of Procedure:  After the appropriate time out and after adequate dosing of anesthesia, the patient was prepped and draped in the usual sterile fashion.  She was placed in the dorsal lithotomy position using Vargas stirrups.  A weighted speculum was placed in the vagina.  The anterior lip of the cervix was grasped with a single tooth tenaculum.  An acorn cannula was placed through the cervix to be used for manipulation during the procedure.  A 2 cm infraumbilical skin incision was made with the knife.  A 10 mm trocar was inserted under direct visualization with the Optiview without any complications.  The abdomen was insufflated with carbon dioxide being sure to keep the pressure less than 15 mmHg.  The patient was placed in Trendelenburg position.  Two ancillary 5 mm  trocars were placed in both the right and left lower quadrants, lateral to the epigastric vessels, under direct visualization without any complications.  The pelvis was explored with the above findings noted.  The patient had filmy adhesions overlying the left tube; these were taken down sharply with Metzenbaum scissors without any complications.  Using the LigaSure, the infundibulopelvic ligaments and round ligaments were taken down sharply to the level of the uterine vessels.  The ureters were identified and noted to be well out of the operative fields at all times.  Attention was then turned toward the vaginal aspect of the procedure.  All instruments had been removed and trocar sleeves left in place.  The weighted speculum was again placed in the vagina.  The posterior mucosa was grasped with pickup forceps and excised sharply with curved Thompson scissors.  The posterior cul-de-sac was entered without incident.  The uterosacral ligaments were clamped bilaterally using curved Ari clamps, excised, and sutured using O Vicryl trans-fixating sutures.  The anterior mucosa was circumcised sharply using curved Thompson scissors.  The anterior peritoneum was identified and the anterior cul-de-sac was entered sharply without incident.  The remaining broad and cardinal ligaments were taken down in succession using curved Ari clamps, excised, and trans-fixated using 0 Vicryl suture.  The specimen was removed and sent for pathologic examination.  The pedicles were inspected vaginally and noted to be hemostatic.  Angle sutures had been placed bilaterally using 0 chromic.  The cuff was then closed with the first suture incorporating the uterosacral ligaments bilaterally as well as the anterior and posterior peritoneum using 0 chromic suture.  The remainder of the cuff was closed with 0 chromic suture in a running, locking, fashion.  The mejía catheter was removed.  Rigid cystoscopy was performed which revealed bilateral  ureteral jet flow of Pyridium stained urine.  There was no evidence of injury to the bladder mucosa.  The mejía catheter was re-anchored.  Repeat inspection of the cuff revealed adequate hemostasis.  A vaginal pack was placed.  The abdomen was again instilled with carbon dioxide.  All pedicles were inspected laparoscopically after suction and irritation.  The pedicles were noted to be hemostatic.  The abdomen was released of carbon dioxide and repeat inspection of the pedicles and ancillary trocar sites revealed adequate hemostasis.  The trocar sleeves had all been removed.  The skin was approximated with 4-0 nylon in an interrupted fashion.  All sponge and instrument counts were correct at the end of the procedure.  The patient tolerated the procedure well.  There were no complications.  She was taken to the postoperative recovery room in stable condition.      Soledad Cox MD   2/7/2017  11:08 AM

## 2017-02-08 VITALS
TEMPERATURE: 97 F | OXYGEN SATURATION: 97 % | SYSTOLIC BLOOD PRESSURE: 128 MMHG | RESPIRATION RATE: 16 BRPM | HEART RATE: 62 BPM | DIASTOLIC BLOOD PRESSURE: 77 MMHG

## 2017-02-08 PROBLEM — N92.1 MENORRHAGIA WITH IRREGULAR CYCLE: Status: ACTIVE | Noted: 2017-02-08

## 2017-02-08 LAB
DEPRECATED RDW RBC AUTO: 46.6 FL (ref 37–54)
ERYTHROCYTE [DISTWIDTH] IN BLOOD BY AUTOMATED COUNT: 13.1 % (ref 11.5–14.5)
HCT VFR BLD AUTO: 39.5 % (ref 37–47)
HGB BLD-MCNC: 13.3 G/DL (ref 12–16)
MCH RBC QN AUTO: 32.8 PG (ref 27–31)
MCHC RBC AUTO-ENTMCNC: 33.7 G/DL (ref 30–37)
MCV RBC AUTO: 97.3 FL (ref 81–99)
PLATELET # BLD AUTO: 406 10*3/MM3 (ref 130–400)
PMV BLD AUTO: 9.5 FL (ref 6–12)
RBC # BLD AUTO: 4.06 10*6/MM3 (ref 4.2–5.4)
WBC NRBC COR # BLD: 24.65 10*3/MM3 (ref 4.8–10.8)

## 2017-02-08 PROCEDURE — 99024 POSTOP FOLLOW-UP VISIT: CPT | Performed by: OBSTETRICS & GYNECOLOGY

## 2017-02-08 PROCEDURE — G0378 HOSPITAL OBSERVATION PER HR: HCPCS

## 2017-02-08 PROCEDURE — 85027 COMPLETE CBC AUTOMATED: CPT | Performed by: OBSTETRICS & GYNECOLOGY

## 2017-02-08 RX ADMIN — OXYCODONE HYDROCHLORIDE AND ACETAMINOPHEN 2 TABLET: 5; 325 TABLET ORAL at 09:46

## 2017-02-08 RX ADMIN — OXYCODONE HYDROCHLORIDE AND ACETAMINOPHEN 2 TABLET: 5; 325 TABLET ORAL at 05:32

## 2017-02-08 RX ADMIN — FAMOTIDINE 20 MG: 20 TABLET, FILM COATED ORAL at 08:50

## 2017-02-08 RX ADMIN — DEXTROSE AND SODIUM CHLORIDE 125 ML/HR: 5; 450 INJECTION, SOLUTION INTRAVENOUS at 04:21

## 2017-02-08 RX ADMIN — DOCUSATE SODIUM 100 MG: 100 CAPSULE, LIQUID FILLED ORAL at 08:50

## 2017-02-08 RX ADMIN — OXYCODONE HYDROCHLORIDE AND ACETAMINOPHEN 2 TABLET: 5; 325 TABLET ORAL at 13:49

## 2017-02-08 RX ADMIN — SIMETHICONE CHEW TAB 80 MG 80 MG: 80 TABLET ORAL at 08:50

## 2017-02-08 NOTE — DISCHARGE SUMMARY
.   Mika Camarillo  : 1970  MRN: [id number]  CSN: [id number]    Discharge Summary      Date of Admission: 2017   Date of Discharge:    Discharge Diagnosis: 1. Menorrhagia  2. Dysmenorrhea  3. Dyspareunia  4. Chronic pelvic pain  5. Persistent adnexal cyst   Procedures Performed: Procedure(s):  LAPAROSCOPIC ASSISTED VAGINAL HYSTERECTOMY BILATERAL SALPINGO OOPHORECTOMY  CYSTOSCOPY  LAPAROSCOPIC LYSIS OF ADHESIONS      Consults: none   Brief History: Patient is a 46 y.o.who presented with the above complaints.  Various options discussed with pt who desired definitive treatment with the above surgical procedure.   Hospital Course: Pt underwent the above procedure without complications.  Pt doing well am POD #1.  Diet advanced.  Vitals stable.  Pt with +flatus post lunch desiring discharge.   Pending Studies: pathology   Condition at discharge: stable   Discharge Medications:    Your medication list      CONTINUE taking these medications       Instructions Last Dose Given Next Dose Due    baclofen 20 MG tablet   Commonly known as:  LIORESAL              hydrochlorothiazide 25 MG tablet   Commonly known as:  HYDRODIURIL              ibuprofen 600 MG tablet   Commonly known as:  ADVIL,MOTRIN              levothyroxine 75 MCG tablet   Commonly known as:  SYNTHROID, LEVOTHROID              omeprazole 20 MG capsule   Commonly known as:  priLOSEC              PROVENTIL  (90 BASE) MCG/ACT inhaler   Generic drug:  albuterol              valsartan-hydrochlorothiazide 320-12.5 MG per tablet   Commonly known as:  DIOVAN-HCT                   Discharge Disposition: home   Follow-up:         Future Appointments  Date Time Provider Department Center   2017 3:00 PM Kaley De La Paz MD [facility]   F/U in office 1 wk Dr. Cox         This note has been electronically signed.    Soledad Cox MD  2017

## 2017-02-08 NOTE — PROGRESS NOTES
Mika Camarillo  : 1970  MRN: [id number]  CSN: [id number]    Post-operative Day #1  Subjective   Her pain is well controlled.  She is not passing gas and has not had a bowel movement.      Objective     Min/max vitals past 24 hours:   Temp  Min: 97.1 °F (36.2 °C)  Max: 99.1 °F (37.3 °C)  BP  Min: 106/62  Max: 178/106  Pulse  Min: 59  Max: 116  Resp  Min: 12  Max: 24        I/O last 3 completed shifts:  In: 5923.2 [I.V.:5923.2]  Out: 2600 [Urine:2600]    General: well developed; well nourished  no acute distress   Resp: breathing is unlabored  clear to auscultation bilaterally   CV: regular rate and rhythm, S1, S2 normal, no murmur, click, rub or gallop   Abdomen: soft, non-tender; no masses  no umbilical or inginual hernias are present  no hepato-splenomegaly  incision is clean, dry, intact and without drainage   Pelvic: Not performed   Ext: normal appearance with no cyanosis or edema and no calf tenderness       WBC   Date/Time Value Ref Range Status   2017 0524 24.65 (H) 4.80 - 10.80 10*3/mm3 Final     HEMOGLOBIN   Date/Time Value Ref Range Status   2017 0524 13.3 12.0 - 16.0 g/dL Final     HEMATOCRIT   Date/Time Value Ref Range Status   2017 0524 39.5 37.0 - 47.0 % Final     PLATELETS   Date/Time Value Ref Range Status   2017 0524 406 (H) 130 - 400 10*3/mm3 Final        Assessment   1. Post-op Day #1 S/P LAVH/BSO/Cysto     Plan   1. Continue routine post-operative care  2. Ambulate  3. Advance diet   4. Possible D/C to home this pm.  5. Instructions and precautions given.    Soledad Cox MD  2017  7:41 AM

## 2017-02-08 NOTE — PROGRESS NOTES
Patient: Addie Camarillo  Procedure(s):  LAPAROSCOPIC ASSISTED VAGINAL HYSTERECTOMY BILATERAL SALPINGO OOPHORECTOMY  CYSTOSCOPY  LAPAROSCOPIC LYSIS OF ADHESIONS  Anesthesia type: [unfilled]    Patient location: [facility] Surgical Floor  Last vitals:   Vitals:    02/08/17 0928   BP: 128/77   Pulse: 62   Resp: 16   Temp: 97 °F (36.1 °C)   SpO2:      Level of consciousness: awake, alert and oriented    Post-anesthesia pain: adequate analgesia  Airway patency: patent  Respiratory: unassisted  Cardiovascular: stable and blood pressure at baseline  Hydration: euvolemic    Anesthetic complications: no, pt ambulates well, denies sore throat, pain controlled and pt comfortable.

## 2017-02-08 NOTE — PLAN OF CARE
Problem: Patient Care Overview (Adult)  Goal: Plan of Care Review  Outcome: Ongoing (interventions implemented as appropriate)    02/08/17 0616   Coping/Psychosocial Response Interventions   Plan Of Care Reviewed With patient   Patient Care Overview   Progress improving   Outcome Evaluation   Outcome Summary/Follow up Plan Tolerating Oral pain med. Heating pad for back ache. Up ad gregory.       Goal: Adult Individualization and Mutuality  Outcome: Ongoing (interventions implemented as appropriate)  Goal: Discharge Needs Assessment  Outcome: Ongoing (interventions implemented as appropriate)    Problem: Hysterectomy (Adult)  Goal: Signs and Symptoms of Listed Potential Problems Will be Absent or Manageable (Hysterectomy)  Outcome: Ongoing (interventions implemented as appropriate)

## 2017-02-08 NOTE — PLAN OF CARE
Problem: Patient Care Overview (Adult)  Goal: Plan of Care Review  Outcome: Outcome(s) achieved Date Met:  02/08/17 02/08/17 1405   Coping/Psychosocial Response Interventions   Plan Of Care Reviewed With patient;spouse   Outcome Evaluation   Outcome Summary/Follow up Plan Pt doing well, follow up plan for 1 week.        Goal: Adult Individualization and Mutuality  Outcome: Outcome(s) achieved Date Met:  02/08/17 02/08/17 0616   Individualization   Patient Specific Preferences Eager for d/ch   Patient Specific Goals Understand takehome instructions   Patient Specific Interventions Take pain med as prescribed. Follow up with Dr. Ellington/Individual Preferences   What Anxieties, Fears or Concerns Do You Have About Your Health or Care? None   What Questions Do You Have About Your Health or Care? What medicine will I go home on?   What Information Would Help Us Give You More Personalized Care? None. Great care.       Goal: Discharge Needs Assessment  Outcome: Outcome(s) achieved Date Met:  02/08/17 02/08/17 1405   Discharge Needs Assessment   Concerns To Be Addressed no discharge needs identified   Readmission Within The Last 30 Days no previous admission in last 30 days   Equipment Needed After Discharge none   Discharge Disposition home or self-care   Current Health   Anticipated Changes Related to Illness none   Self-Care   Equipment Currently Used at Home none   Living Environment   Transportation Available car         Problem: Perioperative Period (Adult)  Goal: Signs and Symptoms of Listed Potential Problems Will be Absent or Manageable (Perioperative Period)  Outcome: Outcome(s) achieved Date Met:  02/08/17 02/08/17 1405   Perioperative Period   Problems Assessed (Perioperative Period) all   Problems Present (Perioperative Period) none         Problem: Hysterectomy (Adult)  Goal: Signs and Symptoms of Listed Potential Problems Will be Absent or Manageable (Hysterectomy)  Outcome: Outcome(s) achieved  Date Met:  02/08/17 02/08/17 1405   Hysterectomy   Problems Assessed (Hysterectomy) all   Problems Present (Hysterectomy) none

## 2017-02-15 LAB
LAB AP CASE REPORT: NORMAL
Lab: NORMAL
PATH REPORT.FINAL DX SPEC: NORMAL

## 2017-02-15 RX ORDER — BACLOFEN 20 MG/1
20 TABLET ORAL 3 TIMES DAILY
Qty: 90 TABLET | Refills: 3 | Status: SHIPPED | OUTPATIENT
Start: 2017-02-15 | End: 2017-12-12 | Stop reason: SDUPTHER

## 2017-02-16 ENCOUNTER — OFFICE VISIT (OUTPATIENT)
Dept: OBSTETRICS AND GYNECOLOGY | Facility: CLINIC | Age: 47
End: 2017-02-16

## 2017-02-16 VITALS
HEIGHT: 58 IN | SYSTOLIC BLOOD PRESSURE: 120 MMHG | DIASTOLIC BLOOD PRESSURE: 78 MMHG | WEIGHT: 144 LBS | BODY MASS INDEX: 30.23 KG/M2

## 2017-02-16 DIAGNOSIS — Z48.816 ENCOUNTER FOR SURGICAL AFTERCARE FOLLOWING SURGERY ON THE GENITOURINARY SYSTEM: ICD-10-CM

## 2017-02-16 DIAGNOSIS — Z90.722 STATUS POST BILATERAL OOPHORECTOMY: ICD-10-CM

## 2017-02-16 DIAGNOSIS — Z90.710 STATUS POST LAPAROSCOPIC HYSTERECTOMY: Primary | ICD-10-CM

## 2017-02-16 PROCEDURE — 99024 POSTOP FOLLOW-UP VISIT: CPT | Performed by: PHYSICIAN ASSISTANT

## 2017-02-16 RX ORDER — BACLOFEN 20 MG/1
TABLET ORAL
COMMUNITY
Start: 2017-02-15 | End: 2017-04-12 | Stop reason: SDUPTHER

## 2017-02-16 RX ORDER — ESTRADIOL 2 MG/1
2 TABLET ORAL DAILY
Qty: 30 TABLET | Refills: 11 | Status: SHIPPED | OUTPATIENT
Start: 2017-02-16 | End: 2018-03-22 | Stop reason: SDUPTHER

## 2017-02-16 NOTE — PROGRESS NOTES
"Subjective   Chief Complaint   Patient presents with   • Post-op     hysterectomy (2017) patient complains of severe hot flashes, spotting, feels like she is getting depressed     Visit Vitals   • /78   • Ht 58\" (147.3 cm)   • Wt 144 lb (65.3 kg)   • LMP 2017   • BMI 30.1 kg/m2      Addie Camarillo is a 46 y.o. year old  presenting to be seen for post op visit  She is 9 days post op LAVH-BSO  She reports significant hot flashes, martinez, irritable  She is having scant vaginal bleeding   Normal bowel and bladder function  Pathology is benign      Past Medical History   Diagnosis Date   • Arthritis    • COPD (chronic obstructive pulmonary disease)    • Full dentures    • GERD (gastroesophageal reflux disease)    • Heart attack    • Hyperlipidemia    • Hypertension    • Hypothyroid    • Kidney stone         Current Outpatient Prescriptions:   •  albuterol (PROVENTIL HFA) 108 (90 BASE) MCG/ACT inhaler, every 6 (six) hours., Disp: , Rfl:   •  baclofen (LIORESAL) 20 MG tablet, Take 20 mg by mouth 3 (Three) Times a Day., Disp: , Rfl: 0  •  diclofenac (VOLTAREN) 1 % gel gel, apply 4 grams to affected area four times a day, Disp: , Rfl: 0  •  hydrochlorothiazide (HYDRODIURIL) 25 MG tablet, Take 25 mg by mouth Daily., Disp: , Rfl:   •  ibuprofen (ADVIL,MOTRIN) 600 MG tablet, , Disp: , Rfl: 0  •  levothyroxine (SYNTHROID, LEVOTHROID) 75 MCG tablet, Take 75 mcg by mouth Daily., Disp: , Rfl: 0  •  omeprazole (PriLOSEC) 20 MG capsule, Take 20 mg by mouth Daily., Disp: , Rfl: 0  •  valsartan-hydrochlorothiazide (DIOVAN-HCT) 320-12.5 MG per tablet, Take 1 tablet by mouth Daily., Disp: , Rfl: 0  •  baclofen (LIORESAL) 20 MG tablet, Take 1 tablet by mouth 3 times daily Dose increase, Disp: , Rfl:   •  estradiol (ESTRACE) 2 MG tablet, Take 1 tablet by mouth Daily., Disp: 30 tablet, Rfl: 11  •  oxyCODONE-acetaminophen (PERCOCET) 5-325 MG per tablet, Take 1 or 2 tablet(s) by mouth every 4 to 6 hours as needed, " Disp: 30 tablet, Rfl: 0   Allergies   Allergen Reactions   • Lortab  [Hydrocodone-Acetaminophen] Nausea Only   • Tetanus Toxoids    • Erythromycin Nausea And Vomiting      Past Surgical History   Procedure Laterality Date   • Cholecystectomy with intraoperative cholangiogram     • Laparoscopic tubal ligation     • Teeth extraction     • Laparoscopic assisted vaginal hysterectomy salpingo oophorectomy N/A 2/7/2017     Procedure: LAPAROSCOPIC ASSISTED VAGINAL HYSTERECTOMY BILATERAL SALPINGO OOPHORECTOMY;  Surgeon: Soledad Cox MD;  Location: [facility] OR;  Service:    • Cystoscopy N/A 2/7/2017     Procedure: CYSTOSCOPY;  Surgeon: Soledad Cox MD;  Location: [facility] OR;  Service:    • Laparoscopic lysis of adhesions Left 2/7/2017     Procedure: LAPAROSCOPIC LYSIS OF ADHESIONS;  Surgeon: Soledad Cox MD;  Location: [facility] OR;  Service:       Social History     Social History   • Marital status:      Spouse name: N/A   • Number of children: N/A   • Years of education: N/A     Occupational History   • Not on file.     Social History Main Topics   • Smoking status: Current Every Day Smoker     Packs/day: 0.50     Years: 16.00     Types: Cigarettes   • Smokeless tobacco: Never Used   • Alcohol use No   • Drug use: 14.00 per week     Special: Marijuana      Comment: 1 to 2 a day - 15 years   • Sexual activity: Defer     Other Topics Concern   • Not on file     Social History Narrative      Family History   Problem Relation Age of Onset   • Cancer Father        The following portions of the patient's history were reviewed and updated as appropriate:problem list, current medications, allergies, past family history, past medical history, past social history and past surgical history.    Review of Systems:      Objective     Physical Exam   Constitutional: She appears well-developed and well-nourished. She is cooperative.   Abdominal: Soft. Normal appearance and bowel sounds are normal. She exhibits no distension and no mass.  There is no tenderness.   Incisions healing well    Neurological: She is alert.   Skin: Skin is warm and dry.   Psychiatric: She has a normal mood and affect. Her behavior is normal.     Addie was seen today for post-op.    Diagnoses and all orders for this visit:    Status post laparoscopic hysterectomy    Status post bilateral oophorectomy    Encounter for surgical aftercare following surgery on the genitourinary system    Other orders  -     estradiol (ESTRACE) 2 MG tablet; Take 1 tablet by mouth Daily.           This note was electronically signed.    Oksana Guthrie PA-C   February 16, 2017

## 2017-02-20 ENCOUNTER — TELEPHONE (OUTPATIENT)
Dept: OBSTETRICS AND GYNECOLOGY | Facility: CLINIC | Age: 47
End: 2017-02-20

## 2017-02-20 RX ORDER — METRONIDAZOLE 500 MG/1
500 TABLET ORAL 2 TIMES DAILY
Qty: 14 TABLET | Refills: 0 | Status: SHIPPED | OUTPATIENT
Start: 2017-02-20 | End: 2017-02-27

## 2017-02-20 NOTE — TELEPHONE ENCOUNTER
Patient is 2 weeks post op and advised she is having a discharge with odor and wanted to see if there was anything we could call in for her? Thanks.

## 2017-02-20 NOTE — TELEPHONE ENCOUNTER
Flagyl 500 mg bid x 7 days--have her follow up in 1 week--if any fever, significant pain or bleeding need to see sooner.

## 2017-03-08 ENCOUNTER — OFFICE VISIT (OUTPATIENT)
Dept: PRIMARY CARE CLINIC | Age: 47
End: 2017-03-08
Payer: COMMERCIAL

## 2017-03-08 VITALS
OXYGEN SATURATION: 98 % | HEIGHT: 58 IN | HEART RATE: 102 BPM | WEIGHT: 138.4 LBS | RESPIRATION RATE: 20 BRPM | SYSTOLIC BLOOD PRESSURE: 122 MMHG | DIASTOLIC BLOOD PRESSURE: 86 MMHG | BODY MASS INDEX: 29.05 KG/M2

## 2017-03-08 DIAGNOSIS — M48.061 LUMBAR STENOSIS: Primary | ICD-10-CM

## 2017-03-08 DIAGNOSIS — N20.0 KIDNEY STONE: ICD-10-CM

## 2017-03-08 DIAGNOSIS — F41.9 ANXIETY: ICD-10-CM

## 2017-03-08 PROCEDURE — 99214 OFFICE O/P EST MOD 30 MIN: CPT | Performed by: NURSE PRACTITIONER

## 2017-03-08 RX ORDER — HYDROXYZINE PAMOATE 25 MG/1
25 CAPSULE ORAL 3 TIMES DAILY PRN
Qty: 60 CAPSULE | Refills: 1 | Status: SHIPPED | OUTPATIENT
Start: 2017-03-08 | End: 2017-04-07

## 2017-03-08 RX ORDER — ESTRADIOL 2 MG/1
2 TABLET ORAL DAILY
COMMUNITY
End: 2019-03-07 | Stop reason: SDUPTHER

## 2017-03-08 ASSESSMENT — ENCOUNTER SYMPTOMS
GASTROINTESTINAL NEGATIVE: 1
RESPIRATORY NEGATIVE: 1
BACK PAIN: 1

## 2017-03-14 ENCOUNTER — HOSPITAL ENCOUNTER (OUTPATIENT)
Dept: GENERAL RADIOLOGY | Age: 47
Discharge: HOME | End: 2017-03-14
Attending: UROLOGY | Admitting: UROLOGY

## 2017-03-14 DIAGNOSIS — N20.0 CALCULUS OF KIDNEY: ICD-10-CM

## 2017-03-14 LAB
A/G RATIO: 1.9 (ref 0.8–2)
ALBUMIN SERPL-MCNC: 4.5 G/DL (ref 3.4–4.8)
ALP BLD-CCNC: 70 U/L (ref 25–100)
ALT SERPL-CCNC: 7 U/L (ref 4–36)
ANION GAP SERPL CALCULATED.3IONS-SCNC: 12 MMOL/L (ref 3–16)
AST SERPL-CCNC: 9 U/L (ref 8–33)
BILIRUB SERPL-MCNC: 0.3 MG/DL (ref 0.3–1.2)
BUN BLDV-MCNC: 9 MG/DL (ref 6–20)
CALCIUM SERPL-MCNC: 9.9 MG/DL (ref 8.5–10.5)
CHLORIDE BLD-SCNC: 102 MMOL/L (ref 98–107)
CO2: 28 MMOL/L (ref 20–30)
CREAT SERPL-MCNC: 0.6 MG/DL (ref 0.4–1.2)
GFR AFRICAN AMERICAN: >59
GFR NON-AFRICAN AMERICAN: >60
GLOBULIN: 2.4 G/DL
GLUCOSE BLD-MCNC: 96 MG/DL (ref 74–106)
POTASSIUM SERPL-SCNC: 4.1 MMOL/L (ref 3.4–5.1)
SODIUM BLD-SCNC: 142 MMOL/L (ref 136–145)
TOTAL PROTEIN: 6.9 G/DL (ref 6.4–8.3)

## 2017-03-22 ENCOUNTER — TELEPHONE (OUTPATIENT)
Dept: PRIMARY CARE CLINIC | Age: 47
End: 2017-03-22

## 2017-03-22 NOTE — TELEPHONE ENCOUNTER
Patient called in had kidney stones busted up and has not took in fluids been vomiting and sick since. Provider had no opening asked other provider advised she should go to the ER. Patient verbally understood.

## 2017-03-27 ENCOUNTER — HOSPITAL ENCOUNTER (OUTPATIENT)
Dept: OTHER | Age: 47
Discharge: HOME | End: 2017-03-27
Attending: UROLOGY | Admitting: UROLOGY

## 2017-03-27 DIAGNOSIS — N20.0 CALCULUS OF KIDNEY: ICD-10-CM

## 2017-03-29 ENCOUNTER — HOSPITAL ENCOUNTER (OUTPATIENT)
Dept: OTHER | Age: 47
Discharge: HOME | End: 2017-03-29
Attending: NURSE PRACTITIONER | Admitting: NURSE PRACTITIONER

## 2017-03-29 ENCOUNTER — OFFICE VISIT (OUTPATIENT)
Dept: PRIMARY CARE CLINIC | Age: 47
End: 2017-03-29
Payer: COMMERCIAL

## 2017-03-29 VITALS
WEIGHT: 147.6 LBS | HEIGHT: 58 IN | OXYGEN SATURATION: 98 % | RESPIRATION RATE: 16 BRPM | SYSTOLIC BLOOD PRESSURE: 122 MMHG | TEMPERATURE: 98.3 F | BODY MASS INDEX: 30.98 KG/M2 | HEART RATE: 71 BPM | DIASTOLIC BLOOD PRESSURE: 80 MMHG

## 2017-03-29 DIAGNOSIS — N20.0 KIDNEY STONES: Primary | ICD-10-CM

## 2017-03-29 DIAGNOSIS — M54.41 CHRONIC RIGHT-SIDED LOW BACK PAIN WITH RIGHT-SIDED SCIATICA: ICD-10-CM

## 2017-03-29 DIAGNOSIS — E03.9 HYPOTHYROIDISM, UNSPECIFIED TYPE: ICD-10-CM

## 2017-03-29 DIAGNOSIS — F32.A ANXIETY AND DEPRESSION: ICD-10-CM

## 2017-03-29 DIAGNOSIS — G89.29 CHRONIC RIGHT-SIDED LOW BACK PAIN WITH RIGHT-SIDED SCIATICA: ICD-10-CM

## 2017-03-29 DIAGNOSIS — F41.9 ANXIETY AND DEPRESSION: ICD-10-CM

## 2017-03-29 DIAGNOSIS — I10 ESSENTIAL HYPERTENSION: ICD-10-CM

## 2017-03-29 LAB — TSH SERPL DL<=0.05 MIU/L-ACNC: 3.92 UIU/ML (ref 0.35–5.5)

## 2017-03-29 PROCEDURE — 99214 OFFICE O/P EST MOD 30 MIN: CPT | Performed by: NURSE PRACTITIONER

## 2017-03-29 RX ORDER — VENLAFAXINE HYDROCHLORIDE 37.5 MG/1
37.5 CAPSULE, EXTENDED RELEASE ORAL DAILY
Qty: 30 CAPSULE | Refills: 3 | Status: SHIPPED | OUTPATIENT
Start: 2017-03-29 | End: 2017-05-03 | Stop reason: DRUGHIGH

## 2017-03-29 RX ORDER — ERGOCALCIFEROL 1.25 MG/1
50000 CAPSULE ORAL WEEKLY
COMMUNITY
End: 2017-12-12 | Stop reason: ALTCHOICE

## 2017-03-29 ASSESSMENT — ENCOUNTER SYMPTOMS
BACK PAIN: 1
RESPIRATORY NEGATIVE: 1
GASTROINTESTINAL NEGATIVE: 1

## 2017-04-12 ENCOUNTER — OFFICE VISIT (OUTPATIENT)
Dept: OBSTETRICS AND GYNECOLOGY | Facility: CLINIC | Age: 47
End: 2017-04-12

## 2017-04-12 VITALS
BODY MASS INDEX: 32.39 KG/M2 | DIASTOLIC BLOOD PRESSURE: 77 MMHG | SYSTOLIC BLOOD PRESSURE: 128 MMHG | HEIGHT: 56 IN | WEIGHT: 144 LBS

## 2017-04-12 DIAGNOSIS — R39.89 POSSIBLE URINARY TRACT INFECTION: Primary | ICD-10-CM

## 2017-04-12 DIAGNOSIS — Z09 POSTOP CHECK: ICD-10-CM

## 2017-04-12 PROBLEM — N20.0 KIDNEY STONE: Status: ACTIVE | Noted: 2017-01-01

## 2017-04-12 LAB
COLOR UR: YELLOW
GLUCOSE UR STRIP-MCNC: NEGATIVE MG/DL
LEUKOCYTE EST, POC: NEGATIVE
NITRITE UR-MCNC: NEGATIVE MG/ML
PROT UR STRIP-MCNC: NEGATIVE MG/DL
RBC # UR STRIP: NEGATIVE /UL

## 2017-04-12 PROCEDURE — 81002 URINALYSIS NONAUTO W/O SCOPE: CPT | Performed by: OBSTETRICS & GYNECOLOGY

## 2017-04-12 PROCEDURE — 99024 POSTOP FOLLOW-UP VISIT: CPT | Performed by: OBSTETRICS & GYNECOLOGY

## 2017-04-12 RX ORDER — VENLAFAXINE 37.5 MG/1
37.5 TABLET ORAL 2 TIMES DAILY
COMMUNITY

## 2017-04-13 NOTE — PROGRESS NOTES
Subjective  Chief Complaint   Patient presents with   • Follow-up     9 WEEKS PO LAVH-BSO, CYSTOSCOPY, PT ADVISED WAS ADMITTED IN HOSPITAL X 3 WEEKS AGO FOR KIDNEY STONES/INFECTION     Patient is 46 y.o.  here for f/u from hysterectomy.  Pt was admitted in [facility] for pyelonephritis with obstruction from kidney stone; Dr. Ramirez.  Pt has f/u with him. Pt just finished antibiotics.   Pt with multiple issues at home with sickness in family; spouse, mother, and son all sick.  Pt also to have back surgery with Dr. Vega.  Pt has not returned to work yet; still feels to weak and does not feel ready.  Pt has cont to have hot flashes and night sweats as well as mood swings but improved.  Pt on estradiol 2mg.    History  Past Medical History:   Diagnosis Date   • Arthritis    • COPD (chronic obstructive pulmonary disease)    • Full dentures    • GERD (gastroesophageal reflux disease)    • Heart attack    • Hyperlipidemia    • Hypertension    • Hypothyroid    • Kidney stone 2017    ADMITTED TO HOSPITAL/INFECTION     Current Outpatient Prescriptions on File Prior to Visit   Medication Sig Dispense Refill   • albuterol (PROVENTIL HFA) 108 (90 BASE) MCG/ACT inhaler every 6 (six) hours.     • baclofen (LIORESAL) 20 MG tablet Take 20 mg by mouth 3 (Three) Times a Day.  0   • diclofenac (VOLTAREN) 1 % gel gel apply 4 grams to affected area four times a day  0   • estradiol (ESTRACE) 2 MG tablet Take 1 tablet by mouth Daily. 30 tablet 11   • hydrochlorothiazide (HYDRODIURIL) 25 MG tablet Take 25 mg by mouth Daily.     • ibuprofen (ADVIL,MOTRIN) 600 MG tablet   0   • levothyroxine (SYNTHROID, LEVOTHROID) 75 MCG tablet Take 75 mcg by mouth Daily.  0   • omeprazole (PriLOSEC) 20 MG capsule Take 20 mg by mouth Daily.  0   • valsartan-hydrochlorothiazide (DIOVAN-HCT) 320-12.5 MG per tablet Take 1 tablet by mouth Daily.  0     No current facility-administered medications on file prior to visit.      Allergies   Allergen  "Reactions   • Lortab  [Hydrocodone-Acetaminophen] Nausea Only   • Tetanus Toxoids    • Erythromycin Nausea And Vomiting     Past Surgical History:   Procedure Laterality Date   • CHOLECYSTECTOMY WITH INTRAOPERATIVE CHOLANGIOGRAM     • CYSTOSCOPY N/A 2/7/2017    Procedure: CYSTOSCOPY;  Surgeon: Soledad Cox MD;  Location: [facility];  Service:    • EXTRACORPOREAL SHOCK WAVE LITHOTRIPSY (ESWL)  03/12/2017    DR FOX   • LAPAROSCOPIC ASSISTED VAGINAL HYSTERECTOMY SALPINGO OOPHORECTOMY N/A 2/7/2017    Procedure: LAPAROSCOPIC ASSISTED VAGINAL HYSTERECTOMY BILATERAL SALPINGO OOPHORECTOMY;  Surgeon: Soledad Cox MD;  Location: [facility] OR;  Service:    • LAPAROSCOPIC LYSIS OF ADHESIONS Left 2/7/2017    Procedure: LAPAROSCOPIC LYSIS OF ADHESIONS;  Surgeon: Soledad Cox MD;  Location: [facility];  Service:    • LAPAROSCOPIC TUBAL LIGATION     • TEETH EXTRACTION       Family History   Problem Relation Age of Onset   • Cancer Father      Social History     Social History   • Marital status:      Spouse name: N/A   • Number of children: N/A   • Years of education: N/A     Social History Main Topics   • Smoking status: Current Every Day Smoker     Packs/day: 0.50     Years: 16.00     Types: Cigarettes   • Smokeless tobacco: Never Used   • Alcohol use No   • Drug use: 14.00 per week     Special: Marijuana      Comment: 1 to 2 a day - 15 years   • Sexual activity: Defer     Other Topics Concern   • None     Social History Narrative     Review of Systems  Pertinent items are noted in HPI    Objective  Vitals:    04/12/17 1525   BP: 128/77   Weight: 144 lb (65.3 kg)   Height: 56\" (142.2 cm)     Physical Exam:  General Appearance: alert, appears stated age and cooperative  Head: normocephalic, without obvious abnormality and atraumatic  Eyes: lids and lashes normal, conjunctivae and sclerae normal, no icterus, no pallor and corneas clear  Neck: suppple, trachea midline and no thyromegaly  Lungs: clear to auscultation, " respirations regular, respirations even and respirations unlabored  Heart: regular rhythm and normal rate, normal S1, S2, no murmur, gallop, or rubs and no click  Abdomen: normal bowel sounds, no masses, no hepatomegaly, no splenomegaly, soft non-tender, no guarding and no rebound tenderness  Pelvic: Clinical staff was present for exam  External genitalia:  normal appearance of the external genitalia including [place]'s and [place]'s glands.  :  urethral meatus normal; urethral hypermobility is absent.  Vaginal:  normal pink mucosa without prolapse or lesions. cuff healed well  Cervix:  absent.  Uterus:  absent.  Adnexa:  normal bimanual exam of the adnexa.  Extremities: moves extremities well, no edema, no cyanosis and no redness  Skin: no bleeding, bruising or rash and no lesions noted  Psych: normal mood and affect, oriented to person, time and place, thought content organized and appropriate judgment    Lab Review   No data reviewed    Imaging   No data reviewed    Assessment/Plan    Problem List Items Addressed This Visit     None      Visit Diagnoses     Possible urinary tract infection    -  Primary    Relevant Orders    POC Urinalysis Dipstick (Completed)    Postop check      Normal exam.  Instructions given regarding activities and f/u.  Pt to f/u with Dr. Ramirez and Dr. Vega as scheduled.            Follow up prn    This note was electronically signed.  Soledad Cox M.D.

## 2017-05-03 ENCOUNTER — OFFICE VISIT (OUTPATIENT)
Dept: PRIMARY CARE CLINIC | Age: 47
End: 2017-05-03
Payer: COMMERCIAL

## 2017-05-03 ENCOUNTER — HOSPITAL ENCOUNTER (OUTPATIENT)
Dept: OTHER | Age: 47
Discharge: HOME | End: 2017-05-03
Attending: NURSE PRACTITIONER | Admitting: NURSE PRACTITIONER

## 2017-05-03 ENCOUNTER — TELEPHONE (OUTPATIENT)
Dept: PRIMARY CARE CLINIC | Age: 47
End: 2017-05-03

## 2017-05-03 VITALS
SYSTOLIC BLOOD PRESSURE: 130 MMHG | HEART RATE: 62 BPM | OXYGEN SATURATION: 97 % | DIASTOLIC BLOOD PRESSURE: 78 MMHG | BODY MASS INDEX: 30.6 KG/M2 | HEIGHT: 58 IN | RESPIRATION RATE: 20 BRPM | WEIGHT: 145.8 LBS

## 2017-05-03 DIAGNOSIS — M48.061 LUMBAR STENOSIS: ICD-10-CM

## 2017-05-03 DIAGNOSIS — E03.9 ACQUIRED HYPOTHYROIDISM: ICD-10-CM

## 2017-05-03 DIAGNOSIS — F32.A ANXIETY AND DEPRESSION: Primary | ICD-10-CM

## 2017-05-03 DIAGNOSIS — F41.9 ANXIETY AND DEPRESSION: Primary | ICD-10-CM

## 2017-05-03 DIAGNOSIS — I10 ESSENTIAL HYPERTENSION: ICD-10-CM

## 2017-05-03 DIAGNOSIS — D72.829 LEUKOCYTOSIS, UNSPECIFIED TYPE: ICD-10-CM

## 2017-05-03 LAB
BASOPHILS ABSOLUTE: 0 K/UL (ref 0–0.1)
BASOPHILS RELATIVE PERCENT: 0.3 %
EOSINOPHILS ABSOLUTE: 0.7 K/UL (ref 0–0.4)
EOSINOPHILS RELATIVE PERCENT: 6.2 %
HCT VFR BLD CALC: 42.8 % (ref 37–47)
HEMOGLOBIN: 14.4 G/DL (ref 11.5–16.5)
LYMPHOCYTES ABSOLUTE: 3.5 K/UL (ref 1.5–4)
LYMPHOCYTES RELATIVE PERCENT: 31.7 % (ref 20–40)
MCH RBC QN AUTO: 32.4 PG (ref 27–32)
MCHC RBC AUTO-ENTMCNC: 33.6 G/DL (ref 31–35)
MCV RBC AUTO: 96.4 FL (ref 80–100)
MONOCYTES ABSOLUTE: 1.1 K/UL (ref 0.2–0.8)
MONOCYTES RELATIVE PERCENT: 9.9 % (ref 3–10)
NEUTROPHILS ABSOLUTE: 5.7 K/UL (ref 2–7.5)
NEUTROPHILS RELATIVE PERCENT: 51.9 %
PDW BLD-RTO: 14.1 % (ref 11–16)
PLATELET # BLD: 398 K/UL (ref 150–400)
PMV BLD AUTO: 10 FL (ref 6–10)
RBC # BLD: 4.44 M/UL (ref 3.8–5.8)
WBC # BLD: 11.1 K/UL (ref 4–11)

## 2017-05-03 PROCEDURE — 99214 OFFICE O/P EST MOD 30 MIN: CPT | Performed by: NURSE PRACTITIONER

## 2017-05-03 RX ORDER — FLUTICASONE FUROATE AND VILANTEROL 100; 25 UG/1; UG/1
1 POWDER RESPIRATORY (INHALATION) DAILY
Qty: 1 EACH | Refills: 5 | COMMUNITY
Start: 2017-05-03 | End: 2017-12-12 | Stop reason: ALTCHOICE

## 2017-05-03 RX ORDER — VENLAFAXINE HYDROCHLORIDE 75 MG/1
75 CAPSULE, EXTENDED RELEASE ORAL DAILY
Qty: 30 CAPSULE | Refills: 3 | Status: SHIPPED | OUTPATIENT
Start: 2017-05-03 | End: 2018-03-26 | Stop reason: DRUGHIGH

## 2017-05-03 ASSESSMENT — ENCOUNTER SYMPTOMS
BACK PAIN: 1
RESPIRATORY NEGATIVE: 1
GASTROINTESTINAL NEGATIVE: 1

## 2017-05-07 ENCOUNTER — HOSPITAL ENCOUNTER (OUTPATIENT)
Dept: OTHER | Age: 47
Discharge: HOME | End: 2017-05-07
Attending: UROLOGY | Admitting: UROLOGY

## 2017-05-07 DIAGNOSIS — N20.0 KIDNEY STONE ON RIGHT SIDE: ICD-10-CM

## 2017-06-01 ENCOUNTER — OFFICE VISIT (OUTPATIENT)
Dept: NEUROSURGERY | Facility: CLINIC | Age: 47
End: 2017-06-01

## 2017-06-01 VITALS — WEIGHT: 143 LBS | BODY MASS INDEX: 32.17 KG/M2 | HEIGHT: 56 IN | TEMPERATURE: 98.6 F

## 2017-06-01 DIAGNOSIS — M43.16 SPONDYLOLISTHESIS OF LUMBAR REGION: Primary | ICD-10-CM

## 2017-06-01 PROCEDURE — 99213 OFFICE O/P EST LOW 20 MIN: CPT | Performed by: NEUROLOGICAL SURGERY

## 2017-06-01 RX ORDER — OXYCODONE AND ACETAMINOPHEN 10; 325 MG/1; MG/1
TABLET ORAL
Refills: 0 | COMMUNITY
Start: 2017-05-08

## 2017-06-01 NOTE — PROGRESS NOTES
Subjective   Addie Camarillo is a 46 y.o. female who presents for follow up of  spondylolisthesis L5-S1.     Addie has symptomatic spondylolisthesis at L5-S1 with bilateral back hip and leg pain.  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  MRI scan shows bilateral foraminal stenosis at L5-S1 with 25% spondylolisthesis.    In the past 4 months she has had a hysterectomy and treatment for kidney stones and is recovering from this.  She would like to wait until fall to consider surgery on her lower back.    The following portions of the patient's history were reviewed, updated as appropriate and approved: allergies, current medications, past family history, past medical history, past social history, past surgical history, review of systems and problem list.     Review of Systems   Constitutional: Negative for activity change, appetite change, chills, diaphoresis, fatigue, fever and unexpected weight change.   HENT: Positive for postnasal drip, rhinorrhea, sinus pressure and sneezing. Negative for congestion, dental problem, drooling, ear discharge, ear pain, facial swelling, hearing loss, mouth sores, nosebleeds, sore throat, tinnitus, trouble swallowing and voice change.    Eyes: Positive for photophobia, pain, redness and itching. Negative for discharge and visual disturbance.   Respiratory: Positive for wheezing. Negative for apnea, cough, choking, chest tightness, shortness of breath and stridor.    Cardiovascular: Negative for chest pain, palpitations and leg swelling.   Gastrointestinal: Negative for abdominal distention, abdominal pain, anal bleeding, blood in stool, constipation, diarrhea, nausea, rectal pain and vomiting.   Endocrine: Positive for cold intolerance and heat intolerance. Negative for polydipsia, polyphagia and polyuria.   Genitourinary: Negative for decreased urine volume, difficulty urinating, dysuria, enuresis, flank pain, frequency, genital sores, hematuria and  urgency.   Musculoskeletal: Positive for arthralgias, back pain, joint swelling, myalgias, neck pain and neck stiffness. Negative for gait problem.   Skin: Positive for rash. Negative for color change, pallor and wound.   Allergic/Immunologic: Negative for environmental allergies, food allergies and immunocompromised state.   Neurological: Positive for weakness, numbness and headaches. Negative for dizziness, tremors, seizures, syncope, facial asymmetry, speech difficulty and light-headedness.   Hematological: Negative for adenopathy. Bruises/bleeds easily.   Psychiatric/Behavioral: Positive for agitation, dysphoric mood and sleep disturbance. Negative for behavioral problems, confusion, decreased concentration, hallucinations, self-injury and suicidal ideas. The patient is nervous/anxious. The patient is not hyperactive.    All other systems reviewed and are negative.      Objective    NEUROLOGICAL EXAMINATION:    Straight leg raising +40° on the right.  Motor, sensory, reflexes intact in the lower extremities.    Assessment   Spondylolisthesis L5-S1 with symptomatic bilateral foraminal stenosis.       Plan   Recommend posterior lumbar interbody fusion with pedicle screw fixation bilaterally L5-S1.  She will call after this summer for scheduling, assuming no change in symptoms or her decision in the meantime.  A functional capacities form was filled out regarding her work and physical capability limitations.       Jurgen Umana MD

## 2017-06-01 NOTE — PATIENT INSTRUCTIONS
Spinal Fusion  Spinal fusion is a procedure to make two or more of the bones in your spine (vertebrae) grow together (fuse). This procedure stops movement between the bones of your spine. This can help:  · Lessen pain.  · Prevent your spine from getting weaker or changing shape.  Bone material (graft) will be put in between the two bones of your spine to help them grow together.  BEFORE THE PROCEDURE  · Ask your doctor about:    Changing or stopping your regular medicines. This is important if you take diabetes medicines or blood thinners.    Taking medicines such as aspirin and ibuprofen. These medicines can thin your blood. Do not take these medicines before your procedure if your doctor tells you not to.  · Ask your doctor how your surgical site will be marked.  · You may be given antibiotic medicine to help prevent infection.  · You will have a physical exam.  · You will have blood and pee (urine) samples taken.  · You may also have tests, such as:    X-rays.    CT scan.    MRI.  · Follow instructions from your doctor about what you cannot eat or drink.  · Plan to have someone take you home after the procedure.  · If you go home right after the procedure, plan to have someone with you for 24 hours.  PROCEDURE  · To reduce your risk of infection:    Your health care team will wash or sanitize their hands.    Your skin will be washed with soap.  · An IV tube will be put into one of your veins.  · You will be given one or more of the following:    A medicine to help you relax (sedative).    A medicine to make you fall asleep (general anesthetic).  · If bone from another part of your body is being used to fill the space between the bones of your spine:    A surgical cut (incision) will be made over the site of the bone graft.    A small part of the bone will be taken out.  · A surgical cut will be made over the bones of your spine that your surgeon is going to work on. This cut may be in one of these areas:    Your  "back.    Your belly (abdomen).    Your side.  · The back muscles will be moved aside so the surgeon can see the bones of your spine.  · If you are having this procedure to treat a disk in your spine that bulges out too far (herniated disk), part of the disk will be taken out.  · The space between the bones of your spine will be filled with one of the following:    Bone from another part of your body.    Bone from a bone donor.    Bone that is not real (is artificial).  · Your back muscles will be moved back into place.  · Screws and rods or metal plates may be used to keep the bones of your spine in place while they grow together.  · Your surgical cuts may be closed.  · A bandage (dressing) may be used to cover your surgical cuts.  The procedure may vary among doctors and hospitals.   AFTER THE PROCEDURE  · You will be given medicine for pain if you need it.  · You will continue to get fluids and medicines through an IV tube.  · Your blood pressure, heart rate, breathing rate, and blood oxygen level will be checked often until the medicines you were given have worn off.  · Do not drive for 24 hours if you received a sedative.  · You may have to wear compression stockings. These stockings help to prevent blood clots and reduce swelling in your legs.  · You will be shown how to move the right way and how to stand and walk. While in bed, you will be told to turn often by \"log rolling.\" This is when you move your whole body without twisting your back.     This information is not intended to replace advice given to you by your health care provider. Make sure you discuss any questions you have with your health care provider.     Document Released: 04/12/2012 Document Revised: 04/10/2017 Document Reviewed: 06/01/2016  [employer] Interactive Patient Education ©2017 [employer]    Spinal Fusion, Care After  These instructions give you information about caring for yourself after your procedure. Your doctor may also give you " more specific instructions. Call your doctor if you have any problems or questions after your procedure.  HOME CARE  Medicines  · Take over-the-counter and prescription medicines only as told by your doctor. These include any medicines for pain.  · Do not drive for 24 hours if you received a sedative.  · Do not drive or use heavy machinery while taking prescription pain medicine.  · If you were prescribed an antibiotic medicine, take it as told by your doctor. Do not stop taking the antibiotic even if you start to feel better.  Surgical Cut (Incision) Care  · Follow instructions from your doctor about how to take care of your surgical cut. Make sure you:    Wash your hands with soap and water before you change your bandage (dressing). If you cannot use soap and water, use hand .    Change your bandage as told by your doctor.    Leave stitches (sutures), skin glue, or skin tape (adhesive) strips in place. They may need to stay in place for 2 weeks or longer. If tape strips get loose and curl up, you may trim the loose edges. Do not remove tape strips completely unless your doctor says it is okay.  · Keep your surgical cut clean and dry. Do not take baths, swim, or use a hot tub until your doctor says it is okay.  · Check your surgical cut and the area around it every day for:    Redness.    Swelling.    Fluid.  Physical Activity  · Return to your normal activities as told by your doctor. Ask your doctor what activities are safe for you. Rest and protect your back as much as you can.  · Follow instructions from your doctor about how to move. Use good posture to help your spine heal.  · Do not lift anything that is heavier than 8 lb (3.6 kg) or as told by your doctor until he or she says that it is safe. Do not lift anything over your head.  · Do not twist or bend at the waist until your doctor says it is okay.  · Avoid pushing or pulling motions.  · Do not sit or lie down in the same position for long periods  of time.  · Do not start to exercise until your doctor says it is okay. Ask your doctor what kinds of exercise you can do to make your back stronger.  General Instructions  · If you were given a brace, use it as told by your doctor.  · Wear compression stockings as told by your doctor.  · Do not use tobacco products. These include cigarettes, chewing tobacco, or e-cigarettes. If you need help quitting, ask your doctor.  · Keep all follow-up visits as told by your doctor. This is important. This includes any visits with your physical therapist, if this applies.  GET HELP IF:  · Your pain gets worse.  · Your medicine does not help your pain.  · Your legs or feet become painful or swollen.  · Your surgical cut is red, swollen, or painful.  · You have fluid, blood, or pus coming from your surgical cut.  · You feel sick to your stomach (nauseous).  · You throw up (vomit).  · Your have weakness or loss of feeling (numbness) in your legs that is new or getting worse.  · You have a fever.  · You have trouble controlling when you pee (urinate) or poop (have a bowel movement).  GET HELP RIGHT AWAY IF:  · Your pain is very bad.  · You have chest pain.  · You have trouble breathing.  · You start to have a cough.  These symptoms may be an emergency. Do not wait to see if the symptoms will go away. Get medical help right away. Call your local emergency services (911 in the [place] Do not drive yourself to the hospital.     This information is not intended to replace advice given to you by your health care provider. Make sure you discuss any questions you have with your health care provider.     Document Released: 04/12/2012 Document Revised: 04/10/2017 Document Reviewed: 06/01/2016  [employer] Interactive Patient Education ©2017 [employer]      If you have any questions in regards to your visit with  today, please do not hesitate to contact our office. Ask to speak with a CMA for any clinical questions you may  have.    Marta Sanches, [facility]    [phone]

## 2017-08-01 ENCOUNTER — OFFICE VISIT (OUTPATIENT)
Dept: PRIMARY CARE CLINIC | Age: 47
End: 2017-08-01
Payer: COMMERCIAL

## 2017-08-01 VITALS
OXYGEN SATURATION: 96 % | HEIGHT: 58 IN | DIASTOLIC BLOOD PRESSURE: 74 MMHG | HEART RATE: 72 BPM | BODY MASS INDEX: 30.9 KG/M2 | SYSTOLIC BLOOD PRESSURE: 126 MMHG | WEIGHT: 147.2 LBS | RESPIRATION RATE: 20 BRPM

## 2017-08-01 DIAGNOSIS — F41.9 ANXIETY: Primary | ICD-10-CM

## 2017-08-01 DIAGNOSIS — L29.9 ITCHING: ICD-10-CM

## 2017-08-01 PROCEDURE — 99214 OFFICE O/P EST MOD 30 MIN: CPT | Performed by: NURSE PRACTITIONER

## 2017-08-01 RX ORDER — VENLAFAXINE HYDROCHLORIDE 150 MG/1
150 CAPSULE, EXTENDED RELEASE ORAL DAILY
Qty: 30 CAPSULE | Refills: 3 | Status: SHIPPED | OUTPATIENT
Start: 2017-08-01 | End: 2017-12-12 | Stop reason: SDUPTHER

## 2017-08-01 RX ORDER — ALPRAZOLAM 0.5 MG/1
0.5 TABLET ORAL 2 TIMES DAILY PRN
Qty: 6 TABLET | Refills: 0 | Status: SHIPPED | OUTPATIENT
Start: 2017-08-01 | End: 2017-08-04

## 2017-08-01 RX ORDER — HYDROXYZINE PAMOATE 25 MG/1
25 CAPSULE ORAL 3 TIMES DAILY PRN
Qty: 90 CAPSULE | Refills: 0 | Status: SHIPPED | OUTPATIENT
Start: 2017-08-01 | End: 2017-09-06 | Stop reason: SDUPTHER

## 2017-08-01 ASSESSMENT — ENCOUNTER SYMPTOMS
GASTROINTESTINAL NEGATIVE: 1
RESPIRATORY NEGATIVE: 1

## 2017-09-06 DIAGNOSIS — L29.9 ITCHING: ICD-10-CM

## 2017-09-06 DIAGNOSIS — F41.9 ANXIETY: ICD-10-CM

## 2017-09-06 RX ORDER — HYDROXYZINE PAMOATE 25 MG/1
CAPSULE ORAL
Qty: 90 CAPSULE | Refills: 0 | Status: SHIPPED | OUTPATIENT
Start: 2017-09-06 | End: 2017-12-12 | Stop reason: ALTCHOICE

## 2017-09-06 RX ORDER — ALPRAZOLAM 0.5 MG/1
TABLET ORAL
Qty: 6 TABLET | Refills: 0 | Status: SHIPPED | OUTPATIENT
Start: 2017-09-06 | End: 2017-12-12 | Stop reason: ALTCHOICE

## 2017-11-11 DIAGNOSIS — J43.9 PULMONARY EMPHYSEMA, UNSPECIFIED EMPHYSEMA TYPE (HCC): ICD-10-CM

## 2017-12-12 ENCOUNTER — OFFICE VISIT (OUTPATIENT)
Dept: PRIMARY CARE CLINIC | Age: 47
End: 2017-12-12
Payer: COMMERCIAL

## 2017-12-12 ENCOUNTER — HOSPITAL ENCOUNTER (OUTPATIENT)
Dept: OTHER | Age: 47
Discharge: HOME | End: 2017-12-12
Attending: NURSE PRACTITIONER | Admitting: NURSE PRACTITIONER

## 2017-12-12 VITALS
HEART RATE: 79 BPM | SYSTOLIC BLOOD PRESSURE: 138 MMHG | RESPIRATION RATE: 18 BRPM | WEIGHT: 165.4 LBS | BODY MASS INDEX: 34.57 KG/M2 | DIASTOLIC BLOOD PRESSURE: 84 MMHG | OXYGEN SATURATION: 95 %

## 2017-12-12 DIAGNOSIS — K21.9 GASTROESOPHAGEAL REFLUX DISEASE, ESOPHAGITIS PRESENCE NOT SPECIFIED: ICD-10-CM

## 2017-12-12 DIAGNOSIS — E55.9 VITAMIN D DEFICIENCY: ICD-10-CM

## 2017-12-12 DIAGNOSIS — M54.41 CHRONIC LEFT-SIDED LOW BACK PAIN WITH RIGHT-SIDED SCIATICA: ICD-10-CM

## 2017-12-12 DIAGNOSIS — Z13.29 THYROID DISORDER SCREEN: ICD-10-CM

## 2017-12-12 DIAGNOSIS — F41.9 ANXIETY AND DEPRESSION: ICD-10-CM

## 2017-12-12 DIAGNOSIS — J42 CHRONIC BRONCHITIS, UNSPECIFIED CHRONIC BRONCHITIS TYPE (HCC): ICD-10-CM

## 2017-12-12 DIAGNOSIS — G89.29 CHRONIC LEFT-SIDED LOW BACK PAIN WITH RIGHT-SIDED SCIATICA: ICD-10-CM

## 2017-12-12 DIAGNOSIS — E78.00 PURE HYPERCHOLESTEROLEMIA: ICD-10-CM

## 2017-12-12 DIAGNOSIS — E78.00 PURE HYPERCHOLESTEROLEMIA: Primary | ICD-10-CM

## 2017-12-12 DIAGNOSIS — I10 ESSENTIAL HYPERTENSION: ICD-10-CM

## 2017-12-12 DIAGNOSIS — F32.A DEPRESSIVE DISORDER: ICD-10-CM

## 2017-12-12 DIAGNOSIS — F32.A ANXIETY AND DEPRESSION: ICD-10-CM

## 2017-12-12 DIAGNOSIS — Z13.31 POSITIVE DEPRESSION SCREENING: ICD-10-CM

## 2017-12-12 DIAGNOSIS — F41.9 ANXIETY: ICD-10-CM

## 2017-12-12 LAB
A/G RATIO: 1.5 (ref 0.8–2)
ALBUMIN SERPL-MCNC: 4.4 G/DL (ref 3.4–4.8)
ALP BLD-CCNC: 90 U/L (ref 25–100)
ALT SERPL-CCNC: 9 U/L (ref 4–36)
ANION GAP SERPL CALCULATED.3IONS-SCNC: 14 MMOL/L (ref 3–16)
AST SERPL-CCNC: 11 U/L (ref 8–33)
BILIRUB SERPL-MCNC: <0.2 MG/DL (ref 0.3–1.2)
BUN BLDV-MCNC: 15 MG/DL (ref 6–20)
CALCIUM SERPL-MCNC: 9.3 MG/DL (ref 8.5–10.5)
CHLORIDE BLD-SCNC: 100 MMOL/L (ref 98–107)
CHOLESTEROL, TOTAL: 238 MG/DL (ref 0–200)
CO2: 26 MMOL/L (ref 20–30)
CREAT SERPL-MCNC: 0.6 MG/DL (ref 0.4–1.2)
FOLATE: 6.67 NG/ML
GFR AFRICAN AMERICAN: >59
GFR NON-AFRICAN AMERICAN: >60
GLOBULIN: 3 G/DL
GLUCOSE BLD-MCNC: 103 MG/DL (ref 74–106)
HCT VFR BLD CALC: 45.7 % (ref 37–47)
HDLC SERPL-MCNC: 66 MG/DL (ref 40–60)
HEMOGLOBIN: 14.8 G/DL (ref 11.5–16.5)
LDL CHOLESTEROL CALCULATED: 143 MG/DL
MCH RBC QN AUTO: 31.8 PG (ref 27–32)
MCHC RBC AUTO-ENTMCNC: 32.4 G/DL (ref 31–35)
MCV RBC AUTO: 98.1 FL (ref 80–100)
PDW BLD-RTO: 13.6 % (ref 11–16)
PLATELET # BLD: 393 K/UL (ref 150–400)
PMV BLD AUTO: 10 FL (ref 6–10)
POTASSIUM SERPL-SCNC: 4.2 MMOL/L (ref 3.4–5.1)
RBC # BLD: 4.66 M/UL (ref 3.8–5.8)
SODIUM BLD-SCNC: 140 MMOL/L (ref 136–145)
TOTAL PROTEIN: 7.4 G/DL (ref 6.4–8.3)
TRIGL SERPL-MCNC: 144 MG/DL (ref 0–249)
TSH SERPL DL<=0.05 MIU/L-ACNC: 17.16 UIU/ML (ref 0.35–5.5)
VITAMIN B-12: 360 PG/ML (ref 211–911)
VITAMIN D 25-HYDROXY: 10.3 (ref 32–100)
VLDLC SERPL CALC-MCNC: 29 MG/DL
WBC # BLD: 11.8 K/UL (ref 4–11)

## 2017-12-12 PROCEDURE — 99214 OFFICE O/P EST MOD 30 MIN: CPT | Performed by: NURSE PRACTITIONER

## 2017-12-12 RX ORDER — PANTOPRAZOLE SODIUM 40 MG/1
40 TABLET, DELAYED RELEASE ORAL DAILY
Qty: 30 TABLET | Refills: 3 | Status: SHIPPED | OUTPATIENT
Start: 2017-12-12 | End: 2018-04-20 | Stop reason: SDUPTHER

## 2017-12-12 RX ORDER — VENLAFAXINE HYDROCHLORIDE 75 MG/1
75 CAPSULE, EXTENDED RELEASE ORAL DAILY
Qty: 30 CAPSULE | Refills: 3 | Status: CANCELLED | OUTPATIENT
Start: 2017-12-12

## 2017-12-12 RX ORDER — FLUTICASONE FUROATE AND VILANTEROL 200; 25 UG/1; UG/1
POWDER RESPIRATORY (INHALATION)
Qty: 1 EACH | Refills: 5 | Status: SHIPPED | OUTPATIENT
Start: 2017-12-12 | End: 2019-03-07 | Stop reason: SDUPTHER

## 2017-12-12 RX ORDER — PREDNISONE 20 MG/1
TABLET ORAL
Qty: 10 TABLET | Refills: 0 | Status: SHIPPED | OUTPATIENT
Start: 2017-12-12 | End: 2018-03-26 | Stop reason: ALTCHOICE

## 2017-12-12 RX ORDER — IBUPROFEN 600 MG/1
TABLET ORAL
Qty: 90 TABLET | Refills: 3 | Status: SHIPPED | OUTPATIENT
Start: 2017-12-12 | End: 2018-04-20 | Stop reason: SDUPTHER

## 2017-12-12 RX ORDER — HYDROCHLOROTHIAZIDE 25 MG/1
TABLET ORAL
Qty: 30 TABLET | Refills: 5 | Status: SHIPPED | OUTPATIENT
Start: 2017-12-12 | End: 2018-07-05 | Stop reason: SDUPTHER

## 2017-12-12 RX ORDER — BACLOFEN 20 MG/1
20 TABLET ORAL 3 TIMES DAILY
Qty: 90 TABLET | Refills: 3 | Status: SHIPPED | OUTPATIENT
Start: 2017-12-12 | End: 2018-04-20 | Stop reason: SDUPTHER

## 2017-12-12 RX ORDER — VENLAFAXINE HYDROCHLORIDE 37.5 MG/1
CAPSULE, EXTENDED RELEASE ORAL
Qty: 60 CAPSULE | Refills: 0 | Status: CANCELLED | OUTPATIENT
Start: 2017-12-12 | End: 2018-06-10

## 2017-12-12 RX ORDER — VENLAFAXINE HYDROCHLORIDE 150 MG/1
150 CAPSULE, EXTENDED RELEASE ORAL DAILY
Qty: 30 CAPSULE | Refills: 3 | Status: SHIPPED | OUTPATIENT
Start: 2017-12-12 | End: 2018-04-20 | Stop reason: SDUPTHER

## 2017-12-12 RX ORDER — VALSARTAN AND HYDROCHLOROTHIAZIDE 320; 12.5 MG/1; MG/1
TABLET, FILM COATED ORAL
Qty: 30 TABLET | Refills: 5 | Status: SHIPPED | OUTPATIENT
Start: 2017-12-12 | End: 2018-07-05 | Stop reason: SDUPTHER

## 2017-12-12 ASSESSMENT — PATIENT HEALTH QUESTIONNAIRE - PHQ9
SUM OF ALL RESPONSES TO PHQ QUESTIONS 1-9: 6
2. FEELING DOWN, DEPRESSED OR HOPELESS: 3
SUM OF ALL RESPONSES TO PHQ9 QUESTIONS 1 & 2: 6
1. LITTLE INTEREST OR PLEASURE IN DOING THINGS: 3

## 2017-12-12 ASSESSMENT — ENCOUNTER SYMPTOMS
GASTROINTESTINAL NEGATIVE: 1
WHEEZING: 1
COUGH: 1
BACK PAIN: 1
SHORTNESS OF BREATH: 1

## 2017-12-12 NOTE — PROGRESS NOTES
has a normal mood and affect. Judgment normal. Her speech is rapid and/or pressured. She is agitated. Nursing note and vitals reviewed. No results found for requested labs within last 30 days. Microscopic Examination (no units)   Date Value   03/22/2017 YES     LDL Calculated (mg/dL)   Date Value   06/22/2016 129         Lab Results   Component Value Date    WBC 11.1 05/03/2017    NEUTROABS 5.7 05/03/2017    HGB 14.4 05/03/2017    HCT 42.8 05/03/2017    MCV 96.4 05/03/2017     05/03/2017       Lab Results   Component Value Date    TSH 3.92 03/29/2017         ASSESSMENT/PLAN:     1. Essential hypertension    - valsartan-hydrochlorothiazide (DIOVAN-HCT) 320-12.5 MG per tablet; take 1 tablet by mouth once daily  Dispense: 30 tablet; Refill: 5  - hydrochlorothiazide (HYDRODIURIL) 25 MG tablet; take 1 tablet by mouth once daily  Dispense: 30 tablet; Refill: 5    2. Pure hypercholesterolemia    - CBC; Future  - Comprehensive Metabolic Panel; Future  - Lipid Panel; Future    3. Anxiety    - venlafaxine (EFFEXOR XR) 150 MG extended release capsule; Take 1 capsule by mouth daily  Dispense: 30 capsule; Refill: 3    4. Anxiety and depression  Continue Effexor. 5. Chronic left-sided low back pain with right-sided sciatica  She is going to go back to the Neurosurgeon about scheduling her back surgery. - baclofen (LIORESAL) 20 MG tablet; Take 1 tablet by mouth 3 times daily Dose increase  Dispense: 90 tablet; Refill: 3  - ibuprofen (ADVIL;MOTRIN) 600 MG tablet; take 1 tablet by mouth every 6 hours if needed for pain  Dispense: 90 tablet; Refill: 3    6. Thyroid disorder screen    - TSH without Reflex; Future    7. Vitamin D deficiency    - Vitamin B12 & Folate; Future  - Vitamin D 25 Hydroxy; Future    8. Chronic bronchitis, unspecified chronic bronchitis type (HCC)    - Fluticasone Furoate-Vilanterol (BREO ELLIPTA) 200-25 MCG/INH AEPB; One inhalation daily  Dispense: 1 each;  Refill: 5  - predniSONE

## 2017-12-13 RX ORDER — ERGOCALCIFEROL (VITAMIN D2) 1250 MCG
50000 CAPSULE ORAL WEEKLY
Qty: 4 CAPSULE | Refills: 4 | Status: SHIPPED | OUTPATIENT
Start: 2017-12-13 | End: 2018-05-31 | Stop reason: SDUPTHER

## 2017-12-13 RX ORDER — ATORVASTATIN CALCIUM 20 MG/1
20 TABLET, FILM COATED ORAL DAILY
Qty: 30 TABLET | Refills: 3 | Status: SHIPPED | OUTPATIENT
Start: 2017-12-13 | End: 2018-04-20 | Stop reason: SDUPTHER

## 2017-12-13 RX ORDER — LEVOTHYROXINE SODIUM 0.07 MG/1
75 TABLET ORAL DAILY
Qty: 30 TABLET | Refills: 3 | Status: SHIPPED | OUTPATIENT
Start: 2017-12-13 | End: 2018-04-20 | Stop reason: SDUPTHER

## 2018-03-22 RX ORDER — ESTRADIOL 2 MG/1
TABLET ORAL
Qty: 30 TABLET | Refills: 1 | Status: SHIPPED | OUTPATIENT
Start: 2018-03-22

## 2018-03-25 RX ORDER — HYDROXYZINE PAMOATE 25 MG/1
CAPSULE ORAL
Qty: 90 CAPSULE | Refills: 0 | Status: SHIPPED | OUTPATIENT
Start: 2018-03-25 | End: 2018-03-26

## 2018-03-26 ENCOUNTER — OFFICE VISIT (OUTPATIENT)
Dept: PRIMARY CARE CLINIC | Age: 48
End: 2018-03-26
Payer: COMMERCIAL

## 2018-03-26 ENCOUNTER — HOSPITAL ENCOUNTER (OUTPATIENT)
Dept: OTHER | Age: 48
Discharge: HOME | End: 2018-03-26
Attending: NURSE PRACTITIONER | Admitting: NURSE PRACTITIONER

## 2018-03-26 VITALS
RESPIRATION RATE: 16 BRPM | BODY MASS INDEX: 37.03 KG/M2 | OXYGEN SATURATION: 97 % | HEIGHT: 58 IN | WEIGHT: 176.4 LBS | TEMPERATURE: 97.5 F | SYSTOLIC BLOOD PRESSURE: 118 MMHG | DIASTOLIC BLOOD PRESSURE: 82 MMHG | HEART RATE: 88 BPM

## 2018-03-26 DIAGNOSIS — E03.9 ACQUIRED HYPOTHYROIDISM: ICD-10-CM

## 2018-03-26 DIAGNOSIS — I10 ESSENTIAL HYPERTENSION: ICD-10-CM

## 2018-03-26 DIAGNOSIS — D72.19 EOSINOPHILIC LEUKOCYTOSIS: ICD-10-CM

## 2018-03-26 DIAGNOSIS — J20.9 ACUTE BRONCHITIS, UNSPECIFIED ORGANISM: ICD-10-CM

## 2018-03-26 DIAGNOSIS — R05.3 CHRONIC COUGH: Primary | ICD-10-CM

## 2018-03-26 DIAGNOSIS — E78.00 PURE HYPERCHOLESTEROLEMIA: ICD-10-CM

## 2018-03-26 DIAGNOSIS — T78.40XA ALLERGIC STATE, INITIAL ENCOUNTER: ICD-10-CM

## 2018-03-26 DIAGNOSIS — R20.0 BILATERAL HAND NUMBNESS: ICD-10-CM

## 2018-03-26 DIAGNOSIS — R20.0 HAND NUMBNESS: ICD-10-CM

## 2018-03-26 DIAGNOSIS — R05.3 CHRONIC COUGH: ICD-10-CM

## 2018-03-26 DIAGNOSIS — E55.9 VITAMIN D DEFICIENCY: ICD-10-CM

## 2018-03-26 LAB
A/G RATIO: 1.6 (ref 0.8–2)
ALBUMIN SERPL-MCNC: 4.2 G/DL (ref 3.4–4.8)
ALP BLD-CCNC: 77 U/L (ref 25–100)
ALT SERPL-CCNC: 9 U/L (ref 4–36)
ANION GAP SERPL CALCULATED.3IONS-SCNC: 13 MMOL/L (ref 3–16)
AST SERPL-CCNC: 11 U/L (ref 8–33)
BILIRUB SERPL-MCNC: <0.2 MG/DL (ref 0.3–1.2)
BUN BLDV-MCNC: 15 MG/DL (ref 6–20)
CALCIUM SERPL-MCNC: 8.9 MG/DL (ref 8.5–10.5)
CHLORIDE BLD-SCNC: 101 MMOL/L (ref 98–107)
CHOLESTEROL, TOTAL: 205 MG/DL (ref 0–200)
CO2: 27 MMOL/L (ref 20–30)
CREAT SERPL-MCNC: 0.6 MG/DL (ref 0.4–1.2)
GFR AFRICAN AMERICAN: >59
GFR NON-AFRICAN AMERICAN: >60
GLOBULIN: 2.6 G/DL
GLUCOSE BLD-MCNC: 95 MG/DL (ref 74–106)
HCT VFR BLD CALC: 42.4 % (ref 37–47)
HDLC SERPL-MCNC: 54 MG/DL (ref 40–60)
HEMOGLOBIN: 14 G/DL (ref 11.5–16.5)
LDL CHOLESTEROL CALCULATED: 96 MG/DL
MCH RBC QN AUTO: 32.1 PG (ref 27–32)
MCHC RBC AUTO-ENTMCNC: 33 G/DL (ref 31–35)
MCV RBC AUTO: 97.2 FL (ref 80–100)
PDW BLD-RTO: 13.2 % (ref 11–16)
PLATELET # BLD: 360 K/UL (ref 150–400)
PMV BLD AUTO: 9 FL (ref 6–10)
POTASSIUM SERPL-SCNC: 4.1 MMOL/L (ref 3.4–5.1)
RBC # BLD: 4.36 M/UL (ref 3.8–5.8)
SODIUM BLD-SCNC: 141 MMOL/L (ref 136–145)
TOTAL PROTEIN: 6.8 G/DL (ref 6.4–8.3)
TRIGL SERPL-MCNC: 275 MG/DL (ref 0–249)
TSH SERPL DL<=0.05 MIU/L-ACNC: 8.03 UIU/ML (ref 0.35–5.5)
VITAMIN D 25-HYDROXY: 21.4 (ref 32–100)
VLDLC SERPL CALC-MCNC: 55 MG/DL
WBC # BLD: 12 K/UL (ref 4–11)

## 2018-03-26 PROCEDURE — 99214 OFFICE O/P EST MOD 30 MIN: CPT | Performed by: NURSE PRACTITIONER

## 2018-03-26 RX ORDER — AMOXICILLIN AND CLAVULANATE POTASSIUM 875; 125 MG/1; MG/1
1 TABLET, FILM COATED ORAL 2 TIMES DAILY
Qty: 20 TABLET | Refills: 0 | Status: SHIPPED | OUTPATIENT
Start: 2018-03-26 | End: 2018-04-05

## 2018-03-26 NOTE — PROGRESS NOTES
SUBJECTIVE:    Patient ID: Maya Weir is a 52 y.o. female. Chief Complaint   Patient presents with    3 Month Follow-Up    Hypertension    Hyperlipidemia         HPI:  She continues with a chronic cough since July. She is still smoking. She is working nights. She is complaining of eyes itching and watering. She is smoking. She is coughing up phlegm sometimes thick and sometimes clear. She is not taking anything for allergies. She is using her inhalers. She says she is taking protonix but still has heartburn. Patient's medications, allergies, past medical, surgical, social and family histories were reviewed and updated as appropriate.       Current Outpatient Prescriptions:     Cetirizine HCl 10 MG CAPS, Take 10 mg by mouth daily, Disp: 30 capsule, Rfl: 5    amoxicillin-clavulanate (AUGMENTIN) 875-125 MG per tablet, Take 1 tablet by mouth 2 times daily for 10 days, Disp: 20 tablet, Rfl: 0    ergocalciferol (DRISDOL) 50891 units capsule, Take 1 capsule by mouth once a week, Disp: 4 capsule, Rfl: 4    levothyroxine (SYNTHROID) 75 MCG tablet, Take 1 tablet by mouth Daily, Disp: 30 tablet, Rfl: 3    atorvastatin (LIPITOR) 20 MG tablet, Take 1 tablet by mouth daily, Disp: 30 tablet, Rfl: 3    venlafaxine (EFFEXOR XR) 150 MG extended release capsule, Take 1 capsule by mouth daily, Disp: 30 capsule, Rfl: 3    baclofen (LIORESAL) 20 MG tablet, Take 1 tablet by mouth 3 times daily Dose increase, Disp: 90 tablet, Rfl: 3    ibuprofen (ADVIL;MOTRIN) 600 MG tablet, take 1 tablet by mouth every 6 hours if needed for pain, Disp: 90 tablet, Rfl: 3    valsartan-hydrochlorothiazide (DIOVAN-HCT) 320-12.5 MG per tablet, take 1 tablet by mouth once daily, Disp: 30 tablet, Rfl: 5    hydrochlorothiazide (HYDRODIURIL) 25 MG tablet, take 1 tablet by mouth once daily, Disp: 30 tablet, Rfl: 5    Fluticasone Furoate-Vilanterol (BREO ELLIPTA) 200-25 MCG/INH AEPB, One inhalation daily, Disp: 1 each, Rfl: 5   pantoprazole (PROTONIX) 40 MG tablet, Take 1 tablet by mouth daily, Disp: 30 tablet, Rfl: 3    VENTOLIN  (90 Base) MCG/ACT inhaler, inhale 2 puffs by mouth every 6 hours if needed for cough wheezing or shortness of breath, Disp: 18 Inhaler, Rfl: 5    estradiol (ESTRACE) 2 MG tablet, Take 2 mg by mouth daily, Disp: , Rfl:     albuterol sulfate HFA (PROVENTIL HFA) 108 (90 BASE) MCG/ACT inhaler, Inhale 2 puffs into the lungs every 6 hours as needed for Wheezing or Shortness of Breath, Disp: 1 Inhaler, Rfl: 5    ipratropium-albuterol (DUONEB) 0.5-2.5 (3) MG/3ML SOLN nebulizer solution, Inhale 3 mLs into the lungs every 4 hours, Disp: 360 mL, Rfl: 3    Review of Systems   Constitutional: Negative. HENT: Negative. Respiratory: Positive for cough. Cardiovascular: Negative. Gastrointestinal: Negative. Genitourinary: Negative. Musculoskeletal: Positive for arthralgias (hand numbness) and gait problem. Skin: Negative. Psychiatric/Behavioral: Negative. OBJECTIVE:  /82 (Site: Left Arm, Position: Sitting, Cuff Size: Medium Adult)   Pulse 88   Temp 97.5 °F (36.4 °C) (Oral)   Resp 16   Ht 4' 10\" (1.473 m)   Wt 176 lb 6.4 oz (80 kg)   LMP 10/20/2016 (Exact Date)   SpO2 97% Comment: room air  BMI 36.87 kg/m²    Physical Exam   Constitutional: She is oriented to person, place, and time. She appears well-developed and well-nourished. HENT:   Head: Normocephalic and atraumatic. Right Ear: External ear normal.   Left Ear: External ear normal.   Nose: Nose normal.   Eyes: Conjunctivae and EOM are normal. Pupils are equal, round, and reactive to light. Neck: Neck supple. No JVD present. No thyromegaly present. Cardiovascular: Normal rate, regular rhythm and normal heart sounds. Pulmonary/Chest: Effort normal. She has wheezes. She has rales. Abdominal: Soft. Bowel sounds are normal. She exhibits no distension. There is no tenderness.    Musculoskeletal: She exhibits no

## 2018-03-27 ENCOUNTER — TELEPHONE (OUTPATIENT)
Dept: PRIMARY CARE CLINIC | Age: 48
End: 2018-03-27

## 2018-03-31 ASSESSMENT — ENCOUNTER SYMPTOMS
COUGH: 1
GASTROINTESTINAL NEGATIVE: 1

## 2018-04-02 RX ORDER — LEVOTHYROXINE SODIUM 0.1 MG/1
100 TABLET ORAL DAILY
Qty: 30 TABLET | Refills: 3 | Status: SHIPPED | OUTPATIENT
Start: 2018-04-02 | End: 2019-03-07 | Stop reason: SDUPTHER

## 2018-04-03 ENCOUNTER — TELEPHONE (OUTPATIENT)
Dept: PRIMARY CARE CLINIC | Age: 48
End: 2018-04-03

## 2018-04-20 DIAGNOSIS — G89.29 CHRONIC LEFT-SIDED LOW BACK PAIN WITH RIGHT-SIDED SCIATICA: ICD-10-CM

## 2018-04-20 DIAGNOSIS — M54.41 CHRONIC LEFT-SIDED LOW BACK PAIN WITH RIGHT-SIDED SCIATICA: ICD-10-CM

## 2018-04-20 DIAGNOSIS — F41.9 ANXIETY: ICD-10-CM

## 2018-04-20 DIAGNOSIS — K21.9 GASTROESOPHAGEAL REFLUX DISEASE, ESOPHAGITIS PRESENCE NOT SPECIFIED: ICD-10-CM

## 2018-04-24 RX ORDER — PANTOPRAZOLE SODIUM 40 MG/1
TABLET, DELAYED RELEASE ORAL
Qty: 30 TABLET | Refills: 3 | Status: SHIPPED | OUTPATIENT
Start: 2018-04-24 | End: 2019-03-07 | Stop reason: SDUPTHER

## 2018-04-24 RX ORDER — IBUPROFEN 600 MG/1
TABLET ORAL
Qty: 90 TABLET | Refills: 3 | Status: SHIPPED | OUTPATIENT
Start: 2018-04-24 | End: 2019-05-24 | Stop reason: SDUPTHER

## 2018-04-24 RX ORDER — BACLOFEN 20 MG/1
TABLET ORAL
Qty: 90 TABLET | Refills: 3 | Status: SHIPPED | OUTPATIENT
Start: 2018-04-24 | End: 2019-03-07 | Stop reason: SDUPTHER

## 2018-04-24 RX ORDER — HYDROXYZINE PAMOATE 25 MG/1
CAPSULE ORAL
Qty: 90 CAPSULE | Refills: 0 | Status: SHIPPED | OUTPATIENT
Start: 2018-04-24 | End: 2018-05-31 | Stop reason: SDUPTHER

## 2018-04-24 RX ORDER — VENLAFAXINE HYDROCHLORIDE 150 MG/1
150 CAPSULE, EXTENDED RELEASE ORAL DAILY
Qty: 30 CAPSULE | Refills: 3 | Status: SHIPPED | OUTPATIENT
Start: 2018-04-24 | End: 2019-03-07 | Stop reason: SDUPTHER

## 2018-04-24 RX ORDER — LEVOTHYROXINE SODIUM 0.07 MG/1
TABLET ORAL
Qty: 30 TABLET | Refills: 3 | Status: SHIPPED | OUTPATIENT
Start: 2018-04-24 | End: 2019-05-31 | Stop reason: DRUGHIGH

## 2018-04-24 RX ORDER — ATORVASTATIN CALCIUM 20 MG/1
TABLET, FILM COATED ORAL
Qty: 30 TABLET | Refills: 3 | Status: SHIPPED | OUTPATIENT
Start: 2018-04-24 | End: 2019-03-07 | Stop reason: SDUPTHER

## 2018-05-31 RX ORDER — ESTRADIOL 2 MG/1
TABLET ORAL
Qty: 30 TABLET | Refills: 1 | OUTPATIENT
Start: 2018-05-31

## 2018-06-01 ENCOUNTER — TELEPHONE (OUTPATIENT)
Dept: PRIMARY CARE CLINIC | Age: 48
End: 2018-06-01

## 2018-06-01 RX ORDER — HYDROXYZINE PAMOATE 25 MG/1
CAPSULE ORAL
Qty: 90 CAPSULE | Refills: 0 | Status: SHIPPED | OUTPATIENT
Start: 2018-06-01 | End: 2019-05-31 | Stop reason: ALTCHOICE

## 2018-06-01 RX ORDER — ERGOCALCIFEROL 1.25 MG/1
CAPSULE ORAL
Qty: 4 CAPSULE | Refills: 4 | Status: SHIPPED | OUTPATIENT
Start: 2018-06-01

## 2018-07-05 DIAGNOSIS — I10 ESSENTIAL HYPERTENSION: ICD-10-CM

## 2018-07-05 RX ORDER — VALSARTAN AND HYDROCHLOROTHIAZIDE 320; 12.5 MG/1; MG/1
TABLET, FILM COATED ORAL
Qty: 30 TABLET | Refills: 5 | Status: SHIPPED | OUTPATIENT
Start: 2018-07-05 | End: 2019-03-07 | Stop reason: SDUPTHER

## 2018-07-05 RX ORDER — HYDROCHLOROTHIAZIDE 25 MG/1
TABLET ORAL
Qty: 30 TABLET | Refills: 5 | Status: SHIPPED | OUTPATIENT
Start: 2018-07-05 | End: 2019-03-07 | Stop reason: SDUPTHER

## 2019-03-07 ENCOUNTER — OFFICE VISIT (OUTPATIENT)
Dept: PRIMARY CARE CLINIC | Age: 49
End: 2019-03-07
Payer: COMMERCIAL

## 2019-03-07 VITALS
DIASTOLIC BLOOD PRESSURE: 88 MMHG | TEMPERATURE: 98.1 F | BODY MASS INDEX: 36.11 KG/M2 | RESPIRATION RATE: 16 BRPM | HEART RATE: 110 BPM | SYSTOLIC BLOOD PRESSURE: 128 MMHG | OXYGEN SATURATION: 98 % | WEIGHT: 172 LBS | HEIGHT: 58 IN

## 2019-03-07 DIAGNOSIS — I10 ESSENTIAL HYPERTENSION: ICD-10-CM

## 2019-03-07 DIAGNOSIS — E78.5 HYPERLIPIDEMIA, UNSPECIFIED HYPERLIPIDEMIA TYPE: ICD-10-CM

## 2019-03-07 DIAGNOSIS — R05.3 CHRONIC COUGH: ICD-10-CM

## 2019-03-07 DIAGNOSIS — K21.9 GASTROESOPHAGEAL REFLUX DISEASE, ESOPHAGITIS PRESENCE NOT SPECIFIED: ICD-10-CM

## 2019-03-07 DIAGNOSIS — J42 CHRONIC BRONCHITIS, UNSPECIFIED CHRONIC BRONCHITIS TYPE (HCC): ICD-10-CM

## 2019-03-07 DIAGNOSIS — E55.9 VITAMIN D DEFICIENCY: ICD-10-CM

## 2019-03-07 DIAGNOSIS — F41.9 ANXIETY: ICD-10-CM

## 2019-03-07 DIAGNOSIS — G89.29 CHRONIC LEFT-SIDED LOW BACK PAIN WITH RIGHT-SIDED SCIATICA: ICD-10-CM

## 2019-03-07 DIAGNOSIS — M54.41 CHRONIC LEFT-SIDED LOW BACK PAIN WITH RIGHT-SIDED SCIATICA: ICD-10-CM

## 2019-03-07 DIAGNOSIS — Z13.31 POSITIVE DEPRESSION SCREENING: ICD-10-CM

## 2019-03-07 DIAGNOSIS — T78.40XA ALLERGIC STATE, INITIAL ENCOUNTER: ICD-10-CM

## 2019-03-07 DIAGNOSIS — E03.9 ACQUIRED HYPOTHYROIDISM: Primary | ICD-10-CM

## 2019-03-07 DIAGNOSIS — J43.9 PULMONARY EMPHYSEMA, UNSPECIFIED EMPHYSEMA TYPE (HCC): ICD-10-CM

## 2019-03-07 PROCEDURE — 96160 PT-FOCUSED HLTH RISK ASSMT: CPT | Performed by: NURSE PRACTITIONER

## 2019-03-07 PROCEDURE — G8431 POS CLIN DEPRES SCRN F/U DOC: HCPCS | Performed by: NURSE PRACTITIONER

## 2019-03-07 PROCEDURE — 99214 OFFICE O/P EST MOD 30 MIN: CPT | Performed by: NURSE PRACTITIONER

## 2019-03-07 RX ORDER — FLUTICASONE FUROATE AND VILANTEROL 200; 25 UG/1; UG/1
POWDER RESPIRATORY (INHALATION)
Qty: 1 EACH | Refills: 5 | Status: SHIPPED | OUTPATIENT
Start: 2019-03-07 | End: 2019-05-31 | Stop reason: ALTCHOICE

## 2019-03-07 RX ORDER — ALBUTEROL SULFATE 90 UG/1
AEROSOL, METERED RESPIRATORY (INHALATION)
Qty: 18 INHALER | Refills: 5 | Status: SHIPPED | OUTPATIENT
Start: 2019-03-07 | End: 2019-05-31 | Stop reason: SDUPTHER

## 2019-03-07 RX ORDER — KETOROLAC TROMETHAMINE 30 MG/ML
60 INJECTION, SOLUTION INTRAMUSCULAR; INTRAVENOUS ONCE
Status: COMPLETED | OUTPATIENT
Start: 2019-03-07 | End: 2019-03-07

## 2019-03-07 RX ORDER — HYDROCHLOROTHIAZIDE 25 MG/1
TABLET ORAL
Qty: 30 TABLET | Refills: 5 | Status: SHIPPED | OUTPATIENT
Start: 2019-03-07

## 2019-03-07 RX ORDER — LEVOTHYROXINE SODIUM 0.1 MG/1
100 TABLET ORAL DAILY
Qty: 30 TABLET | Refills: 3 | Status: SHIPPED | OUTPATIENT
Start: 2019-03-07 | End: 2019-06-20 | Stop reason: DRUGHIGH

## 2019-03-07 RX ORDER — VALSARTAN AND HYDROCHLOROTHIAZIDE 320; 12.5 MG/1; MG/1
TABLET, FILM COATED ORAL
Qty: 30 TABLET | Refills: 5 | Status: SHIPPED | OUTPATIENT
Start: 2019-03-07

## 2019-03-07 RX ORDER — BACLOFEN 20 MG/1
TABLET ORAL
Qty: 90 TABLET | Refills: 3 | Status: SHIPPED | OUTPATIENT
Start: 2019-03-07

## 2019-03-07 RX ORDER — ATORVASTATIN CALCIUM 20 MG/1
TABLET, FILM COATED ORAL
Qty: 30 TABLET | Refills: 3 | Status: SHIPPED | OUTPATIENT
Start: 2019-03-07

## 2019-03-07 RX ORDER — PANTOPRAZOLE SODIUM 40 MG/1
TABLET, DELAYED RELEASE ORAL
Qty: 30 TABLET | Refills: 3 | Status: SHIPPED | OUTPATIENT
Start: 2019-03-07

## 2019-03-07 RX ORDER — VENLAFAXINE HYDROCHLORIDE 150 MG/1
150 CAPSULE, EXTENDED RELEASE ORAL DAILY
Qty: 30 CAPSULE | Refills: 3 | Status: SHIPPED | OUTPATIENT
Start: 2019-03-07

## 2019-03-07 RX ORDER — ESTRADIOL 2 MG/1
2 TABLET ORAL DAILY
Qty: 21 TABLET | Refills: 3 | Status: SHIPPED | OUTPATIENT
Start: 2019-03-07

## 2019-03-07 RX ADMIN — KETOROLAC TROMETHAMINE 60 MG: 30 INJECTION, SOLUTION INTRAMUSCULAR; INTRAVENOUS at 17:52

## 2019-03-07 ASSESSMENT — PATIENT HEALTH QUESTIONNAIRE - PHQ9
SUM OF ALL RESPONSES TO PHQ QUESTIONS 1-9: 21
SUM OF ALL RESPONSES TO PHQ9 QUESTIONS 1 & 2: 6
8. MOVING OR SPEAKING SO SLOWLY THAT OTHER PEOPLE COULD HAVE NOTICED. OR THE OPPOSITE, BEING SO FIGETY OR RESTLESS THAT YOU HAVE BEEN MOVING AROUND A LOT MORE THAN USUAL: 3
4. FEELING TIRED OR HAVING LITTLE ENERGY: 3
9. THOUGHTS THAT YOU WOULD BE BETTER OFF DEAD, OR OF HURTING YOURSELF: 1
6. FEELING BAD ABOUT YOURSELF - OR THAT YOU ARE A FAILURE OR HAVE LET YOURSELF OR YOUR FAMILY DOWN: 1
1. LITTLE INTEREST OR PLEASURE IN DOING THINGS: 3
2. FEELING DOWN, DEPRESSED OR HOPELESS: 3
SUM OF ALL RESPONSES TO PHQ QUESTIONS 1-9: 21
5. POOR APPETITE OR OVEREATING: 1
10. IF YOU CHECKED OFF ANY PROBLEMS, HOW DIFFICULT HAVE THESE PROBLEMS MADE IT FOR YOU TO DO YOUR WORK, TAKE CARE OF THINGS AT HOME, OR GET ALONG WITH OTHER PEOPLE: 3
7. TROUBLE CONCENTRATING ON THINGS, SUCH AS READING THE NEWSPAPER OR WATCHING TELEVISION: 3
3. TROUBLE FALLING OR STAYING ASLEEP: 3

## 2019-03-19 ASSESSMENT — ENCOUNTER SYMPTOMS
GASTROINTESTINAL NEGATIVE: 1
RESPIRATORY NEGATIVE: 1

## 2019-05-24 DIAGNOSIS — M54.41 CHRONIC LEFT-SIDED LOW BACK PAIN WITH RIGHT-SIDED SCIATICA: ICD-10-CM

## 2019-05-24 DIAGNOSIS — G89.29 CHRONIC LEFT-SIDED LOW BACK PAIN WITH RIGHT-SIDED SCIATICA: ICD-10-CM

## 2019-05-24 RX ORDER — IBUPROFEN 600 MG/1
TABLET ORAL
Qty: 90 TABLET | Refills: 3 | Status: SHIPPED | OUTPATIENT
Start: 2019-05-24

## 2019-05-30 ENCOUNTER — HOSPITAL ENCOUNTER (OUTPATIENT)
Facility: HOSPITAL | Age: 49
Discharge: HOME | End: 2019-05-30
Payer: COMMERCIAL

## 2019-05-30 DIAGNOSIS — I10 ESSENTIAL HYPERTENSION: ICD-10-CM

## 2019-05-30 DIAGNOSIS — E55.9 VITAMIN D DEFICIENCY: ICD-10-CM

## 2019-05-30 DIAGNOSIS — E03.9 ACQUIRED HYPOTHYROIDISM: ICD-10-CM

## 2019-05-30 LAB
A/G RATIO: 1.5 (ref 0.8–2)
ALBUMIN SERPL-MCNC: 4.6 G/DL (ref 3.4–4.8)
ALP BLD-CCNC: 96 U/L (ref 25–100)
ALT SERPL-CCNC: 12 U/L (ref 4–36)
ANION GAP SERPL CALCULATED.3IONS-SCNC: 14 MMOL/L (ref 3–16)
AST SERPL-CCNC: 12 U/L (ref 8–33)
BILIRUB SERPL-MCNC: 0.3 MG/DL (ref 0.3–1.2)
BUN BLDV-MCNC: 15 MG/DL (ref 6–20)
CALCIUM SERPL-MCNC: 9.9 MG/DL (ref 8.5–10.5)
CHLORIDE BLD-SCNC: 99 MMOL/L (ref 98–107)
CHOLESTEROL, TOTAL: 242 MG/DL (ref 0–200)
CO2: 26 MMOL/L (ref 20–30)
CREAT SERPL-MCNC: 0.6 MG/DL (ref 0.4–1.2)
FOLATE: 13.2 NG/ML
GFR AFRICAN AMERICAN: >59
GFR NON-AFRICAN AMERICAN: >60
GLOBULIN: 3.1 G/DL
GLUCOSE BLD-MCNC: 130 MG/DL (ref 74–106)
HCT VFR BLD CALC: 47.1 % (ref 37–47)
HDLC SERPL-MCNC: 40 MG/DL (ref 40–60)
HEMOGLOBIN: 15.6 G/DL (ref 11.5–16.5)
LDL CHOLESTEROL CALCULATED: 125 MG/DL
MCH RBC QN AUTO: 32.3 PG (ref 27–32)
MCHC RBC AUTO-ENTMCNC: 33.1 G/DL (ref 31–35)
MCV RBC AUTO: 97.5 FL (ref 80–100)
PDW BLD-RTO: 13.8 % (ref 11–16)
PLATELET # BLD: 420 K/UL (ref 150–400)
PMV BLD AUTO: 9.6 FL (ref 6–10)
POTASSIUM SERPL-SCNC: 4.6 MMOL/L (ref 3.4–5.1)
RBC # BLD: 4.83 M/UL (ref 3.8–5.8)
SODIUM BLD-SCNC: 139 MMOL/L (ref 136–145)
TOTAL PROTEIN: 7.7 G/DL (ref 6.4–8.3)
TRIGL SERPL-MCNC: 383 MG/DL (ref 0–249)
TSH SERPL DL<=0.05 MIU/L-ACNC: 12.68 UIU/ML (ref 0.35–5.5)
VITAMIN B-12: 345 PG/ML (ref 211–911)
VITAMIN D 25-HYDROXY: 14.4 (ref 32–100)
VLDLC SERPL CALC-MCNC: 77 MG/DL
WBC # BLD: 12.2 K/UL (ref 4–11)

## 2019-05-30 PROCEDURE — 84443 ASSAY THYROID STIM HORMONE: CPT

## 2019-05-30 PROCEDURE — 82306 VITAMIN D 25 HYDROXY: CPT

## 2019-05-30 PROCEDURE — 80061 LIPID PANEL: CPT

## 2019-05-30 PROCEDURE — 82607 VITAMIN B-12: CPT

## 2019-05-30 PROCEDURE — 80053 COMPREHEN METABOLIC PANEL: CPT

## 2019-05-30 PROCEDURE — 82746 ASSAY OF FOLIC ACID SERUM: CPT

## 2019-05-30 PROCEDURE — 85027 COMPLETE CBC AUTOMATED: CPT

## 2019-05-31 ENCOUNTER — HOSPITAL ENCOUNTER (OUTPATIENT)
Dept: GENERAL RADIOLOGY | Facility: HOSPITAL | Age: 49
Discharge: HOME | End: 2019-05-31
Payer: COMMERCIAL

## 2019-05-31 ENCOUNTER — HOSPITAL ENCOUNTER (OUTPATIENT)
Facility: HOSPITAL | Age: 49
Discharge: HOME | End: 2019-05-31
Payer: COMMERCIAL

## 2019-05-31 ENCOUNTER — OFFICE VISIT (OUTPATIENT)
Dept: PRIMARY CARE CLINIC | Age: 49
End: 2019-05-31
Payer: COMMERCIAL

## 2019-05-31 VITALS
WEIGHT: 172.8 LBS | HEIGHT: 58 IN | HEART RATE: 98 BPM | SYSTOLIC BLOOD PRESSURE: 111 MMHG | OXYGEN SATURATION: 98 % | TEMPERATURE: 97.2 F | DIASTOLIC BLOOD PRESSURE: 84 MMHG | RESPIRATION RATE: 16 BRPM | BODY MASS INDEX: 36.27 KG/M2

## 2019-05-31 DIAGNOSIS — M79.672 LEFT FOOT PAIN: ICD-10-CM

## 2019-05-31 DIAGNOSIS — G89.29 CHRONIC PAIN OF LEFT ANKLE: ICD-10-CM

## 2019-05-31 DIAGNOSIS — M25.572 CHRONIC PAIN OF LEFT ANKLE: ICD-10-CM

## 2019-05-31 DIAGNOSIS — R42 DIZZINESS: ICD-10-CM

## 2019-05-31 DIAGNOSIS — H53.9 VISION CHANGES: ICD-10-CM

## 2019-05-31 DIAGNOSIS — G89.29 CHRONIC NONINTRACTABLE HEADACHE, UNSPECIFIED HEADACHE TYPE: ICD-10-CM

## 2019-05-31 DIAGNOSIS — R51.9 CHRONIC NONINTRACTABLE HEADACHE, UNSPECIFIED HEADACHE TYPE: ICD-10-CM

## 2019-05-31 DIAGNOSIS — E03.9 ACQUIRED HYPOTHYROIDISM: Primary | ICD-10-CM

## 2019-05-31 PROCEDURE — 73600 X-RAY EXAM OF ANKLE: CPT

## 2019-05-31 PROCEDURE — 73630 X-RAY EXAM OF FOOT: CPT

## 2019-05-31 PROCEDURE — 99214 OFFICE O/P EST MOD 30 MIN: CPT | Performed by: NURSE PRACTITIONER

## 2019-05-31 PROCEDURE — 73620 X-RAY EXAM OF FOOT: CPT

## 2019-05-31 PROCEDURE — 73610 X-RAY EXAM OF ANKLE: CPT

## 2019-05-31 RX ORDER — LEVOTHYROXINE SODIUM 137 UG/1
137 TABLET ORAL DAILY
Qty: 30 TABLET | Refills: 5 | Status: SHIPPED | OUTPATIENT
Start: 2019-05-31

## 2019-05-31 RX ORDER — MECLIZINE HYDROCHLORIDE 25 MG/1
25 TABLET ORAL 3 TIMES DAILY PRN
Qty: 30 TABLET | Refills: 0 | Status: SHIPPED | OUTPATIENT
Start: 2019-05-31 | End: 2019-06-10

## 2019-05-31 ASSESSMENT — ENCOUNTER SYMPTOMS
RESPIRATORY NEGATIVE: 1
GASTROINTESTINAL NEGATIVE: 1

## 2019-05-31 NOTE — PROGRESS NOTES
SUBJECTIVE:    Patient ID: Julio Lockwood is a 50 y.o. female. Chief Complaint   Patient presents with    Follow-up    Discuss Labs    Hypertension    Hypothyroidism         HPI:  She is following up on her htn, hypothyroid and dizziness. She had an episode at the [facility] where she got dizzy and sat down before she passed out. She had a pain behind her right eye she complains of blurred vision at times. She has pressure in her head all the time and dizziness. She denies hearing loss. She says she is taking thyroid medication every day. Patient's medications,allergies, past medical, surgical, social and family histories were reviewed and updated as appropriate.   .  Current Outpatient Medications on File Prior to Visit   Medication Sig Dispense Refill     MG tablet TAKE 1 TABLET EVERY 6 HOURS IF NEEDED FOR PAIN 90 tablet 3    valsartan-hydrochlorothiazide (DIOVAN-HCT) 320-12.5 MG per tablet take 1 tablet by mouth once daily 30 tablet 5    hydrochlorothiazide (HYDRODIURIL) 25 MG tablet take 1 tablet by mouth once daily 30 tablet 5    venlafaxine (EFFEXOR XR) 150 MG extended release capsule Take 1 capsule by mouth daily 30 capsule 3    pantoprazole (PROTONIX) 40 MG tablet take 1 tablet by mouth once daily 30 tablet 3    atorvastatin (LIPITOR) 20 MG tablet take 1 tablet by mouth once daily 30 tablet 3    baclofen (LIORESAL) 20 MG tablet take 1 tablet by mouth three times a day 90 tablet 3    levothyroxine (SYNTHROID) 100 MCG tablet Take 1 tablet by mouth Daily 30 tablet 3    estradiol (ESTRACE) 2 MG tablet Take 1 tablet by mouth daily 21 tablet 3    Cetirizine HCl 10 MG CAPS Take 10 mg by mouth daily 30 capsule 5    albuterol sulfate HFA (PROVENTIL HFA) 108 (90 BASE) MCG/ACT inhaler Inhale 2 puffs into the lungs every 6 hours as needed for Wheezing or Shortness of Breath 1 Inhaler 5    vitamin D (ERGOCALCIFEROL) 92444 units CAPS capsule take 1 capsule by mouth every week 4 capsule 4     No current facility-administered medications on file prior to visit. Review of Systems   Constitutional: Negative. HENT: Negative. Respiratory: Negative. Cardiovascular: Negative. Gastrointestinal: Negative. Genitourinary: Negative. Musculoskeletal: Positive for arthralgias (left ankle and foot pain), joint swelling and myalgias. Skin: Negative. Neurological: Positive for dizziness. Psychiatric/Behavioral: Negative. OBJECTIVE:  /84 (Site: Right Upper Arm, Position: Standing, Cuff Size: Medium Adult)   Pulse 98   Temp 97.2 °F (36.2 °C) (Oral)   Resp 16   Ht 4' 10\" (1.473 m)   Wt 172 lb 12.8 oz (78.4 kg)   LMP 10/20/2016 (Exact Date)   SpO2 98% Comment: room air  BMI 36.12 kg/m²    Physical Exam   Constitutional: She is oriented to person, place, and time. She appears well-developed and well-nourished. HENT:   Head: Normocephalic and atraumatic. Eyes: Pupils are equal, round, and reactive to light. Conjunctivae and EOM are normal.   Neck: Normal range of motion. Neck supple. No JVD present. No thyromegaly present. Cardiovascular: Normal rate and regular rhythm. Exam reveals no gallop and no friction rub. No murmur heard. Pulmonary/Chest: Effort normal and breath sounds normal. No respiratory distress. She has no wheezes. She has no rales. Abdominal: Soft. Bowel sounds are normal. She exhibits no distension. There is no tenderness. There is no guarding. Musculoskeletal: She exhibits no edema. Left ankle: She exhibits decreased range of motion and swelling. Tenderness. Lateral malleolus tenderness found. Feet:    Neurological: She is alert and oriented to person, place, and time. Skin: Skin is warm and dry. No rash noted. Psychiatric: She has a normal mood and affect. Judgment normal.   Nursing note and vitals reviewed.       Results in Past 30 Days  Result Component Current Result Ref Range Previous Result Ref Range   Alb 4.6 (5/30/2019) 3.4 - 4.8 g/dL Not in Time Range    Albumin/Globulin Ratio 1.5 (5/30/2019) 0.8 - 2.0 Not in Time Range    Alkaline Phosphatase 96 (5/30/2019) 25 - 100 U/L Not in Time Range    ALT 12 (5/30/2019) 4 - 36 U/L Not in Time Range    AST 12 (5/30/2019) 8 - 33 U/L Not in Time Range    BUN 15 (5/30/2019) 6 - 20 mg/dL Not in Time Range    Calcium 9.9 (5/30/2019) 8.5 - 10.5 mg/dL Not in Time Range    Chloride 99 (5/30/2019) 98 - 107 mmol/L Not in Time Range    CO2 26 (5/30/2019) 20 - 30 mmol/L Not in Time Range    CREATININE 0.6 (5/30/2019) 0.4 - 1.2 mg/dL Not in Time Range    GFR  >59 (5/30/2019) >59 Not in Time Range    GFR Non- >60 (5/30/2019) >59 Not in Time Range    Globulin 3.1 (5/30/2019) g/dL Not in Time Range    Glucose 130 (H) (5/30/2019) 74 - 106 mg/dL Not in Time Range    Potassium 4.6 (5/30/2019) 3.4 - 5.1 mmol/L Not in Time Range    Sodium 139 (5/30/2019) 136 - 145 mmol/L Not in Time Range    Total Bilirubin 0.3 (5/30/2019) 0.3 - 1.2 mg/dL Not in Time Range    Total Protein 7.7 (5/30/2019) 6.4 - 8.3 g/dL Not in Time Range        Microscopic Examination (no units)   Date Value   03/22/2017 YES     LDL Calculated (mg/dL)   Date Value   05/30/2019 125         Lab Results   Component Value Date    WBC 12.2 05/30/2019    NEUTROABS 5.7 05/03/2017    HGB 15.6 05/30/2019    HCT 47.1 05/30/2019    MCV 97.5 05/30/2019     05/30/2019       Lab Results   Component Value Date    TSH 12.68 (H) 05/30/2019         ASSESSMENT/PLAN:     Pilar Blanchard was seen today for follow-up, discuss labs, hypertension and hypothyroidism. Diagnoses and all orders for this visit:    Acquired hypothyroidism  -     levothyroxine (SYNTHROID) 137 MCG tablet; Take 1 tablet by mouth daily    Chronic nonintractable headache, unspecified headache type  -     CT HEAD WO CONTRAST; Future    Dizziness  -     CT HEAD WO CONTRAST; Future  -     meclizine (ANTIVERT) 25 MG tablet;  Take 1 tablet by mouth 3 times daily as needed for Dizziness    Vision changes  -     CT HEAD WO CONTRAST; Future  -     External Referral To Ophthalmology    Chronic pain of left ankle  -     Cancel: XR ANKLE LEFT (2 VIEWS); Future    Left foot pain  -     Cancel: XR FOOT LEFT (2 VIEWS);  Future      Medications Discontinued During This Encounter   Medication Reason    levothyroxine (SYNTHROID) 75 MCG tablet DOSE ADJUSTMENT    ipratropium-albuterol (DUONEB) 0.5-2.5 (3) MG/3ML SOLN nebulizer solution LIST CLEANUP    hydrOXYzine (VISTARIL) 25 MG capsule Therapy completed    Fluticasone Furoate-Vilanterol (BREO ELLIPTA) 200-25 MCG/INH AEPB Therapy completed    albuterol sulfate HFA (VENTOLIN HFA) 108 (90 Base) MCG/ACT inhaler DUPLICATE

## 2019-06-04 ENCOUNTER — TELEPHONE (OUTPATIENT)
Dept: PRIMARY CARE CLINIC | Age: 49
End: 2019-06-04

## 2019-06-04 DIAGNOSIS — M77.32 HEEL SPUR, LEFT: Primary | ICD-10-CM

## 2019-06-04 NOTE — TELEPHONE ENCOUNTER
Patient needs to be scheduled for  CT Head. We are unable to schedule this patient, due to the office note from 5/31/19 not being closed. Please inform me once completed so patient can be scheduled. Thank you.

## 2019-06-12 ENCOUNTER — TELEPHONE (OUTPATIENT)
Dept: PRIMARY CARE CLINIC | Age: 49
End: 2019-06-12

## 2019-06-20 ENCOUNTER — OFFICE VISIT (OUTPATIENT)
Dept: PRIMARY CARE CLINIC | Age: 49
End: 2019-06-20
Payer: COMMERCIAL

## 2019-06-20 ENCOUNTER — HOSPITAL ENCOUNTER (OUTPATIENT)
Facility: HOSPITAL | Age: 49
Discharge: HOME | End: 2019-06-20
Payer: COMMERCIAL

## 2019-06-20 VITALS
TEMPERATURE: 97.6 F | RESPIRATION RATE: 16 BRPM | SYSTOLIC BLOOD PRESSURE: 122 MMHG | HEIGHT: 58 IN | BODY MASS INDEX: 36.27 KG/M2 | WEIGHT: 172.8 LBS | DIASTOLIC BLOOD PRESSURE: 84 MMHG | OXYGEN SATURATION: 99 % | HEART RATE: 101 BPM

## 2019-06-20 DIAGNOSIS — G44.209 TENSION HEADACHE: ICD-10-CM

## 2019-06-20 DIAGNOSIS — I10 ESSENTIAL HYPERTENSION: ICD-10-CM

## 2019-06-20 DIAGNOSIS — E03.9 ACQUIRED HYPOTHYROIDISM: Primary | ICD-10-CM

## 2019-06-20 DIAGNOSIS — E03.9 ACQUIRED HYPOTHYROIDISM: ICD-10-CM

## 2019-06-20 DIAGNOSIS — M54.6 CHRONIC LEFT-SIDED THORACIC BACK PAIN: ICD-10-CM

## 2019-06-20 DIAGNOSIS — R73.09 ELEVATED GLUCOSE: ICD-10-CM

## 2019-06-20 DIAGNOSIS — G89.29 CHRONIC LEFT-SIDED THORACIC BACK PAIN: ICD-10-CM

## 2019-06-20 PROCEDURE — 84443 ASSAY THYROID STIM HORMONE: CPT

## 2019-06-20 PROCEDURE — 99214 OFFICE O/P EST MOD 30 MIN: CPT | Performed by: NURSE PRACTITIONER

## 2019-06-20 PROCEDURE — 80053 COMPREHEN METABOLIC PANEL: CPT

## 2019-06-20 PROCEDURE — 83036 HEMOGLOBIN GLYCOSYLATED A1C: CPT

## 2019-06-20 RX ORDER — CARBOXYMETHYLCELLULOSE SODIUM 10 MG/ML
GEL OPHTHALMIC
Refills: 0 | COMMUNITY
Start: 2019-06-17

## 2019-06-20 RX ORDER — NICOTINE POLACRILEX 2 MG
LOZENGE BUCCAL
Refills: 0 | COMMUNITY
Start: 2019-06-14

## 2019-06-20 ASSESSMENT — ENCOUNTER SYMPTOMS: BACK PAIN: 1

## 2019-06-20 NOTE — PROGRESS NOTES
Chief Complaint   Patient presents with    Foot Pain           Have you seen any other physician or provider since your last visit yes - podiatrist,eye doctor    Have you had any other diagnostic tests since your last visit? yes - x ray bilateral feet    Have you changed or stopped any medications since your last visit? Yes dose change on synthroid  Goals      Blood Pressure < 140/90      LDL CALC < 100       Patient is here to follow up on bilateral heel pain. She was seen by Dr Krystal Rivera and told she had arthritis and heel spurs. Patient is scheduled for a CT tomorrow. She states she has a headache every day and was told by the eye doctor that it may be her thyroid but she does need glasses. She still has no energy.

## 2019-06-20 NOTE — PROGRESS NOTES
SUBJECTIVE:    Patient ID: Alfie Garcia is a 50 y.o. female. Chief Complaint   Patient presents with    Foot Pain         HPI:  She went to the foot doctor about her foot pain. She has compression socks, insoles and may need injections. She has arthritis and bone spurs. She is on her feet a lot for work. She has ct scan scheduled tomorrow. She is still having headaches with pressure behind her eyes. She is taking her thyroid medication. She went to the eye doctor but no glasses. She is also having some chronic back pain. She has significant back pain history and has a history of kidney stones. She is taking vit d replacement. Patient's medications,allergies, past medical, surgical, social and family histories were reviewed and updated as appropriate.   .  Current Outpatient Medications on File Prior to Visit   Medication Sig Dispense Refill    REFRESH LIQUIGEL 1 % ophthalmic gel   0    EYE ITCH RELIEF 0.025 % ophthalmic solution place 1 drop into both eyes twice a day  0    levothyroxine (SYNTHROID) 137 MCG tablet Take 1 tablet by mouth daily 30 tablet 5     MG tablet TAKE 1 TABLET EVERY 6 HOURS IF NEEDED FOR PAIN 90 tablet 3    valsartan-hydrochlorothiazide (DIOVAN-HCT) 320-12.5 MG per tablet take 1 tablet by mouth once daily 30 tablet 5    hydrochlorothiazide (HYDRODIURIL) 25 MG tablet take 1 tablet by mouth once daily 30 tablet 5    venlafaxine (EFFEXOR XR) 150 MG extended release capsule Take 1 capsule by mouth daily 30 capsule 3    pantoprazole (PROTONIX) 40 MG tablet take 1 tablet by mouth once daily 30 tablet 3    atorvastatin (LIPITOR) 20 MG tablet take 1 tablet by mouth once daily 30 tablet 3    baclofen (LIORESAL) 20 MG tablet take 1 tablet by mouth three times a day 90 tablet 3    estradiol (ESTRACE) 2 MG tablet Take 1 tablet by mouth daily 21 tablet 3    Cetirizine HCl 10 MG CAPS Take 10 mg by mouth daily 30 capsule 5    vitamin D (ERGOCALCIFEROL) 01618 units CAPS and affect. Judgment normal.   Nursing note and vitals reviewed. Results in Past 30 Days  Result Component Current Result Ref Range Previous Result Ref Range   Alb 4.8 (6/20/2019) 3.4 - 4.8 g/dL 4.6 (5/30/2019) 3.4 - 4.8 g/dL   Albumin/Globulin Ratio 1.7 (6/20/2019) 0.8 - 2.0 1.5 (5/30/2019) 0.8 - 2.0   Alkaline Phosphatase 112 (H) (6/20/2019) 25 - 100 U/L 96 (5/30/2019) 25 - 100 U/L   ALT 14 (6/20/2019) 4 - 36 U/L 12 (5/30/2019) 4 - 36 U/L   AST 13 (6/20/2019) 8 - 33 U/L 12 (5/30/2019) 8 - 33 U/L   BUN 13 (6/20/2019) 6 - 20 mg/dL 15 (5/30/2019) 6 - 20 mg/dL   Calcium 9.9 (6/20/2019) 8.5 - 10.5 mg/dL 9.9 (5/30/2019) 8.5 - 10.5 mg/dL   Chloride 98 (6/20/2019) 98 - 107 mmol/L 99 (5/30/2019) 98 - 107 mmol/L   CO2 29 (6/20/2019) 20 - 30 mmol/L 26 (5/30/2019) 20 - 30 mmol/L   CREATININE 0.7 (6/20/2019) 0.4 - 1.2 mg/dL 0.6 (5/30/2019) 0.4 - 1.2 mg/dL   GFR  >59 (6/20/2019) >59 >59 (5/30/2019) >59   GFR Non- >60 (6/20/2019) >59 >60 (5/30/2019) >59   Globulin 2.9 (6/20/2019) g/dL 3.1 (5/30/2019) g/dL   Glucose 86 (6/20/2019) 74 - 106 mg/dL 130 (H) (5/30/2019) 74 - 106 mg/dL   Potassium 4.0 (6/20/2019) 3.4 - 5.1 mmol/L 4.6 (5/30/2019) 3.4 - 5.1 mmol/L   Sodium 142 (6/20/2019) 136 - 145 mmol/L 139 (5/30/2019) 136 - 145 mmol/L   Total Bilirubin 0.3 (6/20/2019) 0.3 - 1.2 mg/dL 0.3 (5/30/2019) 0.3 - 1.2 mg/dL   Total Protein 7.7 (6/20/2019) 6.4 - 8.3 g/dL 7.7 (5/30/2019) 6.4 - 8.3 g/dL       Hemoglobin A1C (%)   Date Value   06/20/2019 5.5     Microscopic Examination (no units)   Date Value   03/22/2017 YES     LDL Calculated (mg/dL)   Date Value   05/30/2019 125         Lab Results   Component Value Date    WBC 12.2 05/30/2019    NEUTROABS 5.7 05/03/2017    HGB 15.6 05/30/2019    HCT 47.1 05/30/2019    MCV 97.5 05/30/2019     05/30/2019       Lab Results   Component Value Date    TSH 4.33 06/20/2019         ASSESSMENT/PLAN:     Frankie Nieto was seen today for foot pain.     Diagnoses and

## 2019-06-21 LAB
A/G RATIO: 1.7 (ref 0.8–2)
ALBUMIN SERPL-MCNC: 4.8 G/DL (ref 3.4–4.8)
ALP BLD-CCNC: 112 U/L (ref 25–100)
ALT SERPL-CCNC: 14 U/L (ref 4–36)
ANION GAP SERPL CALCULATED.3IONS-SCNC: 15 MMOL/L (ref 3–16)
AST SERPL-CCNC: 13 U/L (ref 8–33)
BILIRUB SERPL-MCNC: 0.3 MG/DL (ref 0.3–1.2)
BUN BLDV-MCNC: 13 MG/DL (ref 6–20)
CALCIUM SERPL-MCNC: 9.9 MG/DL (ref 8.5–10.5)
CHLORIDE BLD-SCNC: 98 MMOL/L (ref 98–107)
CO2: 29 MMOL/L (ref 20–30)
CREAT SERPL-MCNC: 0.7 MG/DL (ref 0.4–1.2)
GFR AFRICAN AMERICAN: >59
GFR NON-AFRICAN AMERICAN: >60
GLOBULIN: 2.9 G/DL
GLUCOSE BLD-MCNC: 86 MG/DL (ref 74–106)
HBA1C MFR BLD: 5.5 %
POTASSIUM SERPL-SCNC: 4 MMOL/L (ref 3.4–5.1)
SODIUM BLD-SCNC: 142 MMOL/L (ref 136–145)
TOTAL PROTEIN: 7.7 G/DL (ref 6.4–8.3)
TSH SERPL DL<=0.05 MIU/L-ACNC: 4.33 UIU/ML (ref 0.35–5.5)

## 2019-06-23 ASSESSMENT — ENCOUNTER SYMPTOMS
RESPIRATORY NEGATIVE: 1
GASTROINTESTINAL NEGATIVE: 1

## (undated) DEVICE — DRSNG SURESITE123 2.4X2.8IN

## (undated) DEVICE — ENDOPATH XCEL BLADELESS TROCARS WITH STABILITY SLEEVES: Brand: ENDOPATH XCEL

## (undated) DEVICE — SUT ETHLN 4/0 PS2 PLSTC 1667G

## (undated) DEVICE — ST IRR CYSTO W/SPK 77IN LF

## (undated) DEVICE — PAD SANI MAXI W/ADHS SNG WRP 11IN

## (undated) DEVICE — SUT GUT CHRM 1 SUTUPAK TIES 18IN SG15T

## (undated) DEVICE — SUT GUT CHRM 2/0 SH 27IN G123H

## (undated) DEVICE — 2, DISPOSABLE SUCTION/IRRIGATOR WITHOUT DISPOSABLE TIP: Brand: STRYKEFLOW

## (undated) DEVICE — MONOPOLAR METZENBAUM SCISSOR, MINI BLADE TIP, DISPOSABLE: Brand: MONOPOLAR METZENBAUM SCISSOR, MINI BLADE TIP, DISPOSABLE

## (undated) DEVICE — DEV LAP LIGASURE BLNT DBL 180D 5MM 37CM 1P/U

## (undated) DEVICE — SUT VIC 0/0 UR6 27IN DYED J603H

## (undated) DEVICE — ENDOPATH XCEL UNIVERSAL TROCAR STABLILITY SLEEVES: Brand: ENDOPATH XCEL

## (undated) DEVICE — SUT VIC 0 CT 18IN VIL J752D

## (undated) DEVICE — RICH LAVH: Brand: MEDLINE INDUSTRIES, INC.

## (undated) DEVICE — SUT VIC 0 CT2 CR8 18IN DYED J727D

## (undated) DEVICE — SPNG GZ WOVN 4X4IN 12PLY 10/BX STRL

## (undated) DEVICE — GLV SURG BIOGEL M LTX PF 6 1/2

## (undated) DEVICE — VAGINAL PACKING: Brand: DEROYAL

## (undated) DEVICE — SUT GUT CHRM 0 802H